# Patient Record
Sex: MALE | NOT HISPANIC OR LATINO | Employment: UNEMPLOYED | URBAN - METROPOLITAN AREA
[De-identification: names, ages, dates, MRNs, and addresses within clinical notes are randomized per-mention and may not be internally consistent; named-entity substitution may affect disease eponyms.]

---

## 2017-04-24 ENCOUNTER — HOSPITAL ENCOUNTER (EMERGENCY)
Facility: CLINIC | Age: 27
Discharge: HOME | End: 2017-04-24
Attending: EMERGENCY MEDICINE | Admitting: EMERGENCY MEDICINE

## 2017-04-24 VITALS
SYSTOLIC BLOOD PRESSURE: 106 MMHG | RESPIRATION RATE: 19 BRPM | DIASTOLIC BLOOD PRESSURE: 69 MMHG | OXYGEN SATURATION: 100 %

## 2017-04-24 DIAGNOSIS — F19.10 SUBSTANCE ABUSE (H): ICD-10-CM

## 2017-04-24 LAB — ALCOHOL BREATH TEST: 0 (ref 0–0.01)

## 2017-04-24 PROCEDURE — 99283 EMERGENCY DEPT VISIT LOW MDM: CPT

## 2017-04-24 PROCEDURE — 82075 ASSAY OF BREATH ETHANOL: CPT

## 2017-04-24 PROCEDURE — 99282 EMERGENCY DEPT VISIT SF MDM: CPT | Mod: Z6 | Performed by: EMERGENCY MEDICINE

## 2017-04-24 NOTE — ED NOTES
Pt came via EMS. Was very upset and belligerent, refusing to answer questions and swearing at staff. Pt has 2 different IDs and 3 different medical cards with 3 different names on them. Pt kept stating that he did not want any medical help.

## 2017-04-24 NOTE — DISCHARGE INSTRUCTIONS
Please make an appointment to follow up with Your Primary Care Provider and [facility] (phone: [phone]) in the next week for recheck.    Return to the ER for problems.

## 2017-04-24 NOTE — ED NOTES
Bed: ED09  Expected date: 4/24/17  Expected time: 1:09 PM  Means of arrival:   Comments:  Male, Syncope, Fabi. [phone]

## 2018-02-23 NOTE — PROGRESS NOTES
SUBJECTIVE:   Charles Chapa is a 27 year old male who presents to clinic today for the following health issues:    Abdominal Pain    Duration: 1 year constantly    Description (location/character/radiation): entire stomach       Associated flank pain: None    Intensity:  moderate    Accompanying signs and symptoms:        Fever/Chills: no        Gas/Bloating: YES       Nausea/vomitting: YES       Diarrhea: no        Dysuria or Hematuria: no     History (previous similar pain/trauma/previous testing): on going health concern for about 1 year or more just didn't have health insurance last year    Patient states he has H Pylori     Precipitating or alleviating factors:       Pain worse with eating/BM/urination: none       Pain relieved by BM: YES    Therapies tried and outcome: None    LMP:  not applicable    Had endoscopy in [place] 2 years ago and was found to have H Pylori and was treated with antibiotic and felt better for about 6-7 months.  Stools been dark and hard. Had blood in stool 8 hrs    Inattention:   Thinks had ADHD because his attention span is low for a tleast 8 years now; when at work his mind is somewhere else.   Has 2 siblings with ADHD.   He is also going to school and finds that his grades are dropping.    Rhinorrhea:   Constant drainage from the nose all year round.    Problem list and histories reviewed & adjusted, as indicated.  Additional history: as documented    There is no problem list on file for this patient.    History reviewed. No pertinent surgical history.    Social History   Substance Use Topics     Smoking status: Current Some Day Smoker     Smokeless tobacco: Never Used     Alcohol use No     History reviewed. No pertinent family history.      Reviewed and updated as needed this visit by clinical staff  ROS:  Constitutional, HEENT, cardiovascular, pulmonary and gu systems are negative, except as otherwise noted.    OBJECTIVE:     /76 (BP Location: Right arm, Patient Position:  "Chair, Cuff Size: Adult Regular)  Pulse 75  Temp 98  F (36.7  C) (Oral)  Resp 12  Ht 6' 1.5\" (1.867 m)  Wt 121 lb 6.4 oz (55.1 kg)  SpO2 100%  BMI 15.8 kg/m2  Body mass index is 15.8 kg/(m^2).  GENERAL: healthy, alert and no distress  NECK: no adenopathy and thyroid normal to palpation  RESP: lungs clear to auscultation - no rales, rhonchi or wheezes  CV: regular rate and rhythm, click or rub, no peripheral edema and peripheral pulses strong  ABDOMEN: soft, nontender, without hepatosplenomegaly or masses and bowel sounds normal  MS: no gross musculoskeletal defects noted, no edema    ASSESSMENT/PLAN:     .(R10.13) Abdominal pain, epigastric  (primary encounter diagnosis)  Comment: Differentials: H Pylori, PUD, GERD, Hepatitis, GB disease, Pancreatitis.   Plan: H Pylori antigen, stool, omeprazole (PRILOSEC)         20 MG CR capsule, Comprehensive metabolic         panel, Lipase, CBC with platelets differential    (K59.00) Constipation, unspecified constipation type  Comment: Non-constipating diet discussed.  Increase fiber and fluid and decrease milk and cheese intake. Miralax as directed to yield clean out and then adjust amount to continue to have two soft stool daily for at least several months before weaning medication.  Call with any questions or concerns.      (R49.451) Inattention  Comment: Discussed evaluation by psychologist for diagnosis  Plan: MENTAL HEALTH REFERRAL  - Adult; Assessments         and Testing; ADHD; [facility]: [facility] [phone]; We will contact you to         schedule the appointment or please call with         any questions    (J31.0) Chronic rhinitis, unspecified type  Comment: Likely allergic  Plan: OTC anti histamines.    Follow up in 1 month or sooner with concerns    David Morris MD  [facility]  "

## 2018-02-26 ENCOUNTER — OFFICE VISIT (OUTPATIENT)
Dept: FAMILY MEDICINE | Facility: CLINIC | Age: 28
End: 2018-02-26
Payer: COMMERCIAL

## 2018-02-26 VITALS
SYSTOLIC BLOOD PRESSURE: 110 MMHG | TEMPERATURE: 98 F | RESPIRATION RATE: 12 BRPM | WEIGHT: 121.4 LBS | OXYGEN SATURATION: 100 % | HEART RATE: 75 BPM | DIASTOLIC BLOOD PRESSURE: 76 MMHG | BODY MASS INDEX: 15.58 KG/M2 | HEIGHT: 74 IN

## 2018-02-26 DIAGNOSIS — R41.840 INATTENTION: ICD-10-CM

## 2018-02-26 DIAGNOSIS — R10.13 ABDOMINAL PAIN, EPIGASTRIC: Primary | ICD-10-CM

## 2018-02-26 DIAGNOSIS — K59.00 CONSTIPATION, UNSPECIFIED CONSTIPATION TYPE: ICD-10-CM

## 2018-02-26 DIAGNOSIS — J31.0 CHRONIC RHINITIS, UNSPECIFIED TYPE: ICD-10-CM

## 2018-02-26 PROCEDURE — 99203 OFFICE O/P NEW LOW 30 MIN: CPT | Performed by: FAMILY MEDICINE

## 2018-02-26 NOTE — PATIENT INSTRUCTIONS
[facility]    If you have any questions regarding to your visit please contact your care team:       Team Purple:   Clinic Hours Telephone Number   Dr. Trudy Dias   7am-7pm  Monday - Thursday   7am-5pm  Fridays  [phone]  (Appointment scheduling available 24/7)    Questions about your Visit?   Team Line:  [phone]   Urgent Care - [place] and [facility] - 11am-9pm Monday-Friday Saturday-Sunday- 9am-5pm   [place] - 5pm-9pm Monday-Friday Saturday-Sunday- 9am-5pm  [phone] - [facility]  [phone] - [place]       What options do I have for visits at the clinic other than the traditional office visit?  To expand how we care for you, many of our providers are utilizing electronic visits (e-visits) and telephone visits, when medically appropriate, for interactions with their patients rather than a visit in the clinic.   We also offer nurse visits for many medical concerns. Just like any other service, we will bill your insurance company for this type of visit based on time spent on the phone with your provider. Not all insurance companies cover these visits. Please check with your medical insurance if this type of visit is covered. You will be responsible for any charges that are not paid by your insurance.      E-visits via Impulcity:  generally incur a $35.00 fee.  Telephone visits:  Time spent on the phone: *charged based on time that is spent on the phone in increments of 10 minutes. Estimated cost:   5-10 mins $30.00   11-20 mins. $59.00   21-30 mins. $85.00     Use Avensohart (secure email communication and access to your chart) to send your primary care provider a message or make an appointment. Ask someone on your Team how to sign up for Impulcity.  For a Price Quote for your services, please call our Consumer Price Line at [phone].  As always, Thank you for trusting us with your health care  needs!    Discharged by Rosalinda BYRD CMA ([facility])

## 2018-02-26 NOTE — MR AVS SNAPSHOT
After Visit Summary   2/26/2018    Charles Chapa    MRN: [id number]           Patient Information     Date Of Birth          1990        Visit Information        Provider Department      2/26/2018 2:20 PM David Morris MD [facility]        Today's Diagnoses     Abdominal pain, epigastric    -  1    Constipation, unspecified constipation type        Inattention        Chronic rhinitis, unspecified type          Care Instructions    [facility]    If you have any questions regarding to your visit please contact your care team:       Team Purple:   Clinic Hours Telephone Number   Dr. Trudy Dias   7am-7pm  Monday - Thursday   7am-5pm  Fridays  [phone]  (Appointment scheduling available 24/7)    Questions about your Visit?   Team Line:  [phone]   [facility] [place] [facility] 11am-9pm Monday-Friday Saturday-Sunday- 9am-5pm   [place] - 5pm-9pm Monday-Friday Saturday-Sunday- 9am-5pm  [phone] - [facility]  [phone] - [place]       What options do I have for visits at the clinic other than the traditional office visit?  To expand how we care for you, many of our providers are utilizing electronic visits (e-visits) and telephone visits, when medically appropriate, for interactions with their patients rather than a visit in the clinic.   We also offer nurse visits for many medical concerns. Just like any other service, we will bill your insurance company for this type of visit based on time spent on the phone with your provider. Not all insurance companies cover these visits. Please check with your medical insurance if this type of visit is covered. You will be responsible for any charges that are not paid by your insurance.      E-visits via Time Bomb Deals:  generally incur a $35.00 fee.  Telephone visits:  Time spent on the phone: *charged based on time that is spent  on the phone in increments of 10 minutes. Estimated cost:   5-10 mins $30.00   11-20 mins. $59.00   21-30 mins. $85.00     Use [email or web address] (secure email communication and access to your chart) to send your primary care provider a message or make an appointment. Ask someone on your Team how to sign up for PHRQLt.  For a Price Quote for your services, please call our [employer] Price Line at [phone].  As always, Thank you for trusting us with your health care needs!    Discharged by Rosalinda BYRD CMA ([facility])            Follow-ups after your visit        Additional Services     MENTAL HEALTH REFERRAL  - Adult; Assessments and Testing; ADHD; [facility]: [facility] [phone]; We will contact you to schedule the appointment or please call with any questions       All scheduling is subject to the client's specific insurance plan & benefits, provider/location availability, and provider clinical specialities.  Please arrive 15 minutes early for your first appointment and bring your completed paperwork.    Please be aware that coverage of these services is subject to the terms and limitations of your health insurance plan.  Call member services at your health plan with any benefit or coverage questions.                            Future tests that were ordered for you today     Open Future Orders        Priority Expected Expires Ordered    H Pylori antigen, stool Routine  3/28/2018 2/26/2018            Who to contact     If you have questions or need follow up information about today's clinic visit or your schedule please contact [facility] directly at [phone].  Normal or non-critical lab and imaging results will be communicated to you by MyChart, letter or phone within 4 business days after the clinic has received the results. If you do not hear from us within 7 days, please contact the clinic through MyChart or phone. If you have a critical or abnormal lab result, we will notify you by phone  "as soon as possible.  Submit refill requests through LensVector or call your pharmacy and they will forward the refill request to us. Please allow 3 business days for your refill to be completed.          Additional Information About Your Visit        LensVector Information     LensVector lets you send messages to your doctor, view your test results, renew your prescriptions, schedule appointments and more. To sign up, go to [email or web address] . Click on \"Log in\" on the left side of the screen, which will take you to the Welcome page. Then click on \"Sign up Now\" on the right side of the page.     You will be asked to enter the access code listed below, as well as some personal information. Please follow the directions to create your username and password.     Your access code is: [email or web address]  Expires: 4/10/2018  7:46 AM     Your access code will  in 90 days. If you need help or a new code, please call your [place] clinic or [phone].        Care EveryWhere ID     This is your Care EveryWhere ID. This could be used by other organizations to access your [place] medical records  [id number]        Your Vitals Were     Pulse Temperature Respirations Height Pulse Oximetry BMI (Body Mass Index)    75 98  F (36.7  C) (Oral) 12 6' 1.5\" (1.867 m) 100% 15.8 kg/m2       Blood Pressure from Last 3 Encounters:   18 110/76   17 106/69    Weight from Last 3 Encounters:   18 121 lb 6.4 oz (55.1 kg)              We Performed the Following     CBC with platelets differential     Comprehensive metabolic panel     Lipase     MENTAL HEALTH REFERRAL  - Adult; Assessments and Testing; ADHD; [facility]: [facility] [phone]; We will contact you to schedule the appointment or please call with any questions          Today's Medication Changes          These changes are accurate as of 18  2:59 PM.  If you have any questions, ask your nurse or doctor.               Start taking these medicines.  "       Dose/Directions    omeprazole 20 MG CR capsule   Commonly known as:  priLOSEC   Used for:  Abdominal pain, epigastric   Started by:  David Morris MD        Dose:  20 mg   Take 1 capsule (20 mg) by mouth daily   Quantity:  15 capsule   Refills:  1            Where to get your medicines      These medications [place] [facility] [place] [address]  [address], [place] [address]     Phone:  [phone]     omeprazole 20 MG CR capsule                Primary Care Provider Office Phone # Fax #    David Morris [phone] [address] [address]        Equal Access to Services     [facility] : Hadii adraino ku hadasho Soomaali, waaxda luqadaha, qaybta kaalmada adeegyada, waxfrederick calhoun . So [facility] [phone].    ATENCIÓN: Si habla español, tiene a munroe disposición servicios gratuitos de asistencia lingüística. [facility] [phone].    We comply with applicable federal civil rights laws and [place] laws. We do not discriminate on the basis of race, color, national origin, age, disability, sex, sexual orientation, or gender identity.            Thank you!     Thank you for choosing [facility]  for your care. Our goal is always to provide you with excellent care. Hearing back from our patients is one way we can continue to improve our services. Please take a few minutes to complete the written survey that you may receive in the mail after your visit with us. Thank you!             Your Updated Medication List - Protect others around you: Learn how to safely use, store and throw away your medicines at www.disposemymeds.org.          This list is accurate as of 2/26/18  2:59 PM.  Always use your most recent med list.                   Brand Name Dispense Instructions for use Diagnosis    omeprazole 20 MG CR capsule    priLOSEC    15 capsule    Take 1 capsule (20 mg)  by mouth daily    Abdominal pain, epigastric

## 2018-02-26 NOTE — NURSING NOTE
"Chief Complaint   Patient presents with     Abdominal Pain     Health Maintenance     TDAP     Referral     Mental Health     Initial /76 (BP Location: Right arm, Patient Position: Chair, Cuff Size: Adult Regular)  Pulse 75  Temp 98  F (36.7  C) (Oral)  Resp 12  Ht 6' 1.5\" (1.867 m)  Wt 121 lb 6.4 oz (55.1 kg)  SpO2 100%  BMI 15.8 kg/m2 Estimated body mass index is 15.8 kg/(m^2) as calculated from the following:    Height as of this encounter: 6' 1.5\" (1.867 m).    Weight as of this encounter: 121 lb 6.4 oz (55.1 kg).  Medication Reconciliation: complete     Alfonzo Hobbs  "

## 2018-03-01 DIAGNOSIS — R10.13 ABDOMINAL PAIN, EPIGASTRIC: ICD-10-CM

## 2018-03-01 PROCEDURE — 87338 HPYLORI STOOL AG IA: CPT | Performed by: FAMILY MEDICINE

## 2018-03-05 ENCOUNTER — TELEPHONE (OUTPATIENT)
Dept: FAMILY MEDICINE | Facility: CLINIC | Age: 28
End: 2018-03-05

## 2018-03-05 DIAGNOSIS — R10.13 ABDOMINAL PAIN, EPIGASTRIC: Primary | ICD-10-CM

## 2018-03-05 LAB
H PYLORI AG STL QL IA: NORMAL
SPECIMEN SOURCE: NORMAL

## 2018-03-05 NOTE — TELEPHONE ENCOUNTER
Called pt and advised that H pylori test came back negative. Wondering about a breath test. He does not believe this result. Would like to know where he should go for the breath test, or would like a call back from provider to discuss.    Rosalinda Heck RN  [facility]

## 2018-03-05 NOTE — TELEPHONE ENCOUNTER
Reason for Call:  Other call back    Detailed comments: Test results. Patient would like a call back.    Phone Number Patient can be reached at: Home number on file [phone] (home)    Best Time: Any    Can we leave a detailed message on this number? YES    Call taken on 3/5/2018 at 12:42 PM by Annabella Covington

## 2018-03-07 ENCOUNTER — TELEPHONE (OUTPATIENT)
Dept: FAMILY MEDICINE | Facility: CLINIC | Age: 28
End: 2018-03-07

## 2018-03-07 NOTE — TELEPHONE ENCOUNTER
Detailed message left on patient's VM with note as written below.  Advised him via VM to schedule a lab appointment at [phone]    Laura Pacheco RN

## 2018-03-07 NOTE — TELEPHONE ENCOUNTER
Reason for Call:  Other call back    Detailed comments: Patient is asking for a referral for an endoscopy/please contact patient with next steps.    Phone Number Patient can be reached at: Home number on file [phone] (home)    Best Time: Anytime    Can we leave a detailed message on this number? YES    Call taken on 3/7/2018 at 1:43 PM by Hailee Flores

## 2018-03-12 DIAGNOSIS — R10.13 ABDOMINAL PAIN, EPIGASTRIC: ICD-10-CM

## 2018-03-12 PROCEDURE — 78267 UREA BREATH TST C-14 ACQUISJ: CPT | Performed by: FAMILY MEDICINE

## 2018-03-12 PROCEDURE — 78268 UREA BREATH TEST C-14 ALYS: CPT | Performed by: FAMILY MEDICINE

## 2018-03-14 ENCOUNTER — TELEPHONE (OUTPATIENT)
Dept: FAMILY MEDICINE | Facility: CLINIC | Age: 28
End: 2018-03-14

## 2018-03-14 NOTE — TELEPHONE ENCOUNTER
Reason for Call:  Other results    Detailed comments: pt had lab work done yesterday, calling for results. Please call pt     Phone Number Patient can be reached at: Home number on file [phone] (home)    Best Time: any    Can we leave a detailed message on this number? YES    Call taken on 3/14/2018 at 12:04 PM by Felipa Hurtado

## 2018-03-14 NOTE — TELEPHONE ENCOUNTER
Spoke with patient and informed the results are still in process. Clinic will call when results come in.     Serena Green RN

## 2018-03-19 LAB — UREA BREATH TEST QL: 38 DPM

## 2018-03-26 NOTE — PROGRESS NOTES
"  SUBJECTIVE:   Charles Chapa is a 27 year old male who presents to clinic today for the following health issues:    Chief Complaint   Patient presents with     Patient Request     requesting test for H pylori               Problem list and histories reviewed & adjusted, as indicated.  Additional history: as documented    There is no problem list on file for this patient.    No past surgical history on file.    Social History   Substance Use Topics     Smoking status: Current Some Day Smoker     Smokeless tobacco: Never Used     Alcohol use No     No family history on file.      Current Outpatient Prescriptions   Medication Sig Dispense Refill     omeprazole (PRILOSEC) 20 MG CR capsule Take 1 capsule (20 mg) by mouth daily (Patient not taking: Reported on 3/27/2018) 15 capsule 1     BP Readings from Last 3 Encounters:   03/27/18 114/74   02/26/18 110/76   04/24/17 106/69    Wt Readings from Last 3 Encounters:   03/27/18 127 lb (57.6 kg)   02/26/18 121 lb 6.4 oz (55.1 kg)                  Labs reviewed in EPIC    Reviewed and updated as needed this visit by clinical staff  Allergies  Meds       Reviewed and updated as needed this visit by Provider         ROS:  This 27 year old male is here today because he is convinced that he has H Pylori again. He was studying medical school in [place] a little over a year ago and had an upper endoscopy where H Pylori was found. Now he has abdomen pains and early satiety. Just can't gain weight because he gets full easily.  He saw Dr. Jackson 1 month ago for his pains and he did both the breath test and the stool test which were negative. He doesn't believe those tests as he was not fasting and his stool sat out for 3 hours before he brought them to the lab. 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 He is overdue for Tdap but refuses to have it because \"he doesn't believe in vaccinations\". He " doesn't trust the government either. He just wants another breath test today. All other review of systems are negative  Personal, family, and social history reviewed with patient and revised.         OBJECTIVE:     /74  Pulse 70  Temp 98.3  F (36.8  C) (Oral)  Resp 16  Wt 127 lb (57.6 kg)  SpO2 99%  BMI 16.53 kg/m2  Body mass index is 16.53 kg/(m^2).  GENERAL: healthy, alert and no distress, but very slim man. Very strong willed and opinionated.   NECK: no adenopathy, no asymmetry, masses, or scars and thyroid normal to palpation  RESP: lungs clear to auscultation - no rales, rhonchi or wheezes  CV: regular rate and rhythm, normal S1 S2, no S3 or S4, or click or rub, no peripheral edema and peripheral pulses strong, but he has a 2/6 systolic ejection murmur heard best at left sternal border   ABDOMEN: soft, no hepatosplenomegaly, no masses and bowel sounds normal, but he is tender in his epigastric area and just above his umbilicus.   MS: no gross musculoskeletal defects noted, no edema    Diagnostic Test Results:  none     ASSESSMENT/PLAN:              1. Stomach pain  As above. His lab tests are not the best way to diagnose H Pylori. Given his other symptoms, he needs a full upper endo   - GASTROENTEROLOGY ADULT REF PROCEDURE ONLY Other; [place] [facility] [phone]    2. Early satiety  As above   - GASTROENTEROLOGY ADULT REF PROCEDURE ONLY Other; [place] [facility] [phone]    3. Heart murmur  As above, he declined EKG    4. Need for prophylactic vaccination with tetanus-diphtheria (TD)  As above, he declined Tdap      Return to clinic if no improvement     BALAJI ZENDEJAS MD  [facility]

## 2018-03-27 ENCOUNTER — OFFICE VISIT (OUTPATIENT)
Dept: FAMILY MEDICINE | Facility: CLINIC | Age: 28
End: 2018-03-27
Payer: COMMERCIAL

## 2018-03-27 VITALS
BODY MASS INDEX: 16.53 KG/M2 | WEIGHT: 127 LBS | HEART RATE: 70 BPM | OXYGEN SATURATION: 99 % | SYSTOLIC BLOOD PRESSURE: 114 MMHG | RESPIRATION RATE: 16 BRPM | TEMPERATURE: 98.3 F | DIASTOLIC BLOOD PRESSURE: 74 MMHG

## 2018-03-27 DIAGNOSIS — R68.81 EARLY SATIETY: ICD-10-CM

## 2018-03-27 DIAGNOSIS — Z23 NEED FOR PROPHYLACTIC VACCINATION WITH TETANUS-DIPHTHERIA (TD): ICD-10-CM

## 2018-03-27 DIAGNOSIS — R01.1 HEART MURMUR: ICD-10-CM

## 2018-03-27 DIAGNOSIS — R10.9 STOMACH PAIN: Primary | ICD-10-CM

## 2018-03-27 PROCEDURE — 99213 OFFICE O/P EST LOW 20 MIN: CPT | Performed by: FAMILY MEDICINE

## 2018-03-27 ASSESSMENT — PAIN SCALES - GENERAL: PAINLEVEL: NO PAIN (0)

## 2018-03-27 NOTE — PATIENT INSTRUCTIONS
[facility]    If you have any questions regarding to your visit please contact your care team:       Team Purple:   Clinic Hours Telephone Number   Dr. Trudy Dias   7am-7pm  Monday - Thursday   7am-5pm  Fridays  [phone]  (Appointment scheduling available 24/7)    Questions about your Visit?   Team Line:  [phone]   Urgent Care - [place] and [facility] - 11am-9pm Monday-Friday Saturday-Sunday- 9am-5pm   [place] - 5pm-9pm Monday-Friday Saturday-Sunday- 9am-5pm  [phone] - [facility]  [phone] - [place]       What options do I have for visits at the clinic other than the traditional office visit?  To expand how we care for you, many of our providers are utilizing electronic visits (e-visits) and telephone visits, when medically appropriate, for interactions with their patients rather than a visit in the clinic.   We also offer nurse visits for many medical concerns. Just like any other service, we will bill your insurance company for this type of visit based on time spent on the phone with your provider. Not all insurance companies cover these visits. Please check with your medical insurance if this type of visit is covered. You will be responsible for any charges that are not paid by your insurance.      E-visits via Tryolabs:  generally incur a $35.00 fee.  Telephone visits:  Time spent on the phone: *charged based on time that is spent on the phone in increments of 10 minutes. Estimated cost:   5-10 mins $30.00   11-20 mins. $59.00   21-30 mins. $85.00     Use [employer] (secure email communication and access to your chart) to send your primary care provider a message or make an appointment. Ask someone on your Team how to sign up for Tryolabs.  For a Price Quote for your services, please call our Consumer Price Line at [phone].  As always, Thank you for trusting us with your health care needs!

## 2018-03-27 NOTE — MR AVS SNAPSHOT
After Visit Summary   3/27/2018    Charles Chapa    MRN: [id number]           Patient Information     Date Of Birth          1990        Visit Information        Provider Department      3/27/2018 2:30 PM Trudy Bonner MD [facility]        Today's Diagnoses     Stomach pain    -  1    Early satiety        Heart murmur        Need for prophylactic vaccination with tetanus-diphtheria (TD)          Care Instructions    [facility]    If you have any questions regarding to your visit please contact your care team:       Team Purple:   Clinic Hours Telephone Number   Dr. Trudy Dias   7am-7pm  Monday - Thursday   7am-5pm  Fridays  [phone]  (Appointment scheduling available 24/7)    Questions about your Visit?   Team Line:  [phone]   Urgent [facility] - [place] [facility] - 11am-9pm Monday-Friday Saturday-Sunday- 9am-5pm   [place] - 5pm-9pm Monday-Friday Saturday-Sunday- 9am-5pm  [phone] - [facility]  [phone] - [place]       What options do I have for visits at the clinic other than the traditional office visit?  To expand how we care for you, many of our providers are utilizing electronic visits (e-visits) and telephone visits, when medically appropriate, for interactions with their patients rather than a visit in the clinic.   We also offer nurse visits for many medical concerns. Just like any other service, we will bill your insurance company for this type of visit based on time spent on the phone with your provider. Not all insurance companies cover these visits. Please check with your medical insurance if this type of visit is covered. You will be responsible for any charges that are not paid by your insurance.      E-visits via [employer]:  generally incur a $35.00 fee.  Telephone visits:  Time spent on the phone: *charged based on time that is spent on the phone in  increments of 10 minutes. Estimated cost:   5-10 mins $30.00   11-20 mins. $59.00   21-30 mins. $85.00     Use [employer] (secure email communication and access to your chart) to send your primary care provider a message or make an appointment. Ask someone on your Team how to sign up for [employer].  For a Price Quote for your services, please call our [employer] Line at [phone].  As always, Thank you for trusting us with your health care needs!              Follow-ups after your visit        Additional Services     GASTROENTEROLOGY ADULT REF PROCEDURE ONLY Other; [facility] [phone]       Last Lab Result: No results found for: CR  Body mass index is 16.53 kg/(m^2).      Patient will be contacted to schedule procedure.     Please be aware that coverage of these services is subject to the terms and limitations of your health insurance plan.  Call member services at your health plan with any benefit or coverage questions.  Any procedures must be performed at a [place] facility OR coordinated by your clinic's referral office.    Please bring the following with you to your appointment:    (1) Any X-Rays, CTs or MRIs which have been performed.  Contact the facility where they were done to arrange for  prior to your scheduled appointment.    (2) List of current medications   (3) This referral request   (4) Any documents/labs given to you for this referral                  Who to contact     If you have questions or need follow up information about today's clinic visit or your schedule please contact [facility] [place] directly at [phone].  Normal or non-critical lab and imaging results will be communicated to you by MyChart, letter or phone within 4 business days after the clinic has received the results. If you do not hear from us within 7 days, please contact the clinic through MyChart or phone. If you have a critical or abnormal lab result, we will notify you by phone as soon as possible.  Submit  "refill requests through [employer] or call your pharmacy and they will forward the refill request to us. Please allow 3 business days for your refill to be completed.          Additional Information About Your Visit        [facility] Information     [employer] lets you send messages to your doctor, view your test results, renew your prescriptions, schedule appointments and more. To sign up, go to [email or web address] . Click on \"Log in\" on the left side of the screen, which will take you to the Welcome page. Then click on \"Sign up Now\" on the right side of the page.     You will be asked to enter the access code listed below, as well as some personal information. Please follow the directions to create your username and password.     Your access code is: [email or web address]  Expires: 4/10/2018  8:46 AM     Your access code will  in 90 days. If you need help or a new code, please call your [place] clinic or [phone].        Care EveryWhere ID     This is your Care EveryWhere ID. This could be used by other organizations to access your [place] medical records  [id number]        Your Vitals Were     Pulse Temperature Respirations Pulse Oximetry BMI (Body Mass Index)       70 98.3  F (36.8  C) (Oral) 16 99% 16.53 kg/m2        Blood Pressure from Last 3 Encounters:   18 114/74   18 110/76   17 106/69    Weight from Last 3 Encounters:   18 127 lb (57.6 kg)   18 121 lb 6.4 oz (55.1 kg)              We Performed the Following     GASTROENTEROLOGY ADULT REF PROCEDURE ONLY Other; [facility] [phone]        Primary Care Provider Office Phone # Fax #    Trudy Bonner [phone]       [address] [address]        Equal Access to Services     VLADIMIR WAYNE : Alfredo Ambriz, waalanna mitchellqyassine, flory liriano, brandon [place]. So [facility] [phone].    ATENCIÓN: Si esme montgomery, " tiene a munroe disposición servicios gratuitos de asistencia lingüística. Fernando de souza [phone].    We comply with applicable federal civil rights laws and [place] laws. We do not discriminate on the basis of race, color, national origin, age, disability, sex, sexual orientation, or gender identity.            Thank you!     Thank you for choosing [facility]  for your care. Our goal is always to provide you with excellent care. Hearing back from our patients is one way we can continue to improve our services. Please take a few minutes to complete the written survey that you may receive in the mail after your visit with us. Thank you!             Your Updated Medication List - Protect others around you: Learn how to safely use, store and throw away your medicines at www.disposemymeds.org.          This list is accurate as of 3/27/18  3:12 PM.  Always use your most recent med list.                   Brand Name Dispense Instructions for use Diagnosis    omeprazole 20 MG CR capsule    priLOSEC    15 capsule    Take 1 capsule (20 mg) by mouth daily    Abdominal pain, epigastric

## 2018-06-28 ENCOUNTER — OFFICE VISIT (OUTPATIENT)
Dept: FAMILY MEDICINE | Facility: CLINIC | Age: 28
End: 2018-06-28
Payer: COMMERCIAL

## 2018-06-28 VITALS
HEART RATE: 76 BPM | DIASTOLIC BLOOD PRESSURE: 57 MMHG | HEIGHT: 74 IN | WEIGHT: 124 LBS | TEMPERATURE: 97.9 F | BODY MASS INDEX: 15.92 KG/M2 | SYSTOLIC BLOOD PRESSURE: 89 MMHG

## 2018-06-28 DIAGNOSIS — Z91.199 NO-SHOW FOR APPOINTMENT: Primary | ICD-10-CM

## 2018-06-28 NOTE — PROGRESS NOTES
SUBJECTIVE:   Charles Chapa is a 27 year old male who presents to clinic today for the following health issues:      ABDOMINAL PAIN     Onset: few months     Description:   Character: Sharp  Location: epigastric region  Radiation: None    Intensity: severe    Progression of Symptoms:  worsening and constant    Accompanying Signs & Symptoms:  Fever/Chills?: YES  Gas/Bloating: YES  Nausea: YES  Vomitting: YES  Diarrhea?: YES  Constipation:YES  Dysuria or Hematuria: no    History:   Trauma: no   Previous similar pain: YES   Previous tests done: none    Precipitating factors:   Does the pain change with:     Food: no      BM: YES- gettng better    Urination: no     Alleviating factors:  eating    Therapies Tried and outcome: Omeprazole did not help with symptoms     LMP:  not applicable       This provider went into patient room at 2:58 pm (for the scheduled 3 pm appointment) and patient was not in the room.  Medical assistant staff searched the clinic and patient not found.  Provider waited until 3:35 pm and patient still did not come back to the room.    Non-billable visit.    Leann Stewart, DNP, APRN, CNP

## 2018-06-28 NOTE — MR AVS SNAPSHOT
"              After Visit Summary   6/28/2018    Charles Chapa    MRN: [id number]           Patient Information     Date Of Birth          1990        Visit Information        Provider Department      6/28/2018 3:00 PM Leann Stewart NP [facility]        Today's Diagnoses     No-show for appointment    -  1       Follow-ups after your visit        Who to contact     If you have questions or need follow up information about today's clinic visit or your schedule please contact [facility] [place] [facility] directly at [phone].  Normal or non-critical lab and imaging results will be communicated to you by MyChart, letter or phone within 4 business days after the clinic has received the results. If you do not hear from us within 7 days, please contact the clinic through MyChart or phone. If you have a critical or abnormal lab result, we will notify you by phone as soon as possible.  Submit refill requests through [employer] or call your pharmacy and they will forward the refill request to us. Please allow 3 business days for your refill to be completed.          Additional Information About Your Visit        Care EveryWhere ID     This is your Care EveryWhere ID. This could be used by other organizations to access your [place] medical records  [id number]        Your Vitals Were     Pulse Temperature Height BMI (Body Mass Index)          76 97.9  F (36.6  C) (Oral) 6' 1.5\" (1.867 m) 16.14 kg/m2         Blood Pressure from Last 3 Encounters:   06/28/18 (!) 89/57   03/27/18 114/74   02/26/18 110/76    Weight from Last 3 Encounters:   06/28/18 124 lb (56.2 kg)   03/27/18 127 lb (57.6 kg)   02/26/18 121 lb 6.4 oz (55.1 kg)              Today, you had the following     No orders found for display       Primary Care Provider Office Phone # Fax #    Trudy Bonner [phone] [address] [facility] [address]        Equal Access to Services     [facility] " GAAR : Hadii adriano sibley beryl Ambriz, waaxda luqadaha, qaybta kabertda jejuan mmaddie, waxfrederick talib hayjose tomasjaimeaurelio [place]. So [facility] [phone].    ATENCIÓN: Si habla español, tiene a munroe disposición servicios gratuitos de asistencia lingüística. Llame al [phone].    We comply with applicable federal civil rights laws and [place] laws. We do not discriminate on the basis of race, color, national origin, age, disability, sex, sexual orientation, or gender identity.            Thank you!     Thank you for choosing [facility]  for your care. Our goal is always to provide you with excellent care. Hearing back from our patients is one way we can continue to improve our services. Please take a few minutes to complete the written survey that you may receive in the mail after your visit with us. Thank you!             Your Updated Medication List - Protect others around you: Learn how to safely use, store and throw away your medicines at www.disposemymeds.org.          This list is accurate as of 6/28/18  3:38 PM.  Always use your most recent med list.                   Brand Name Dispense Instructions for use Diagnosis    omeprazole 20 MG CR capsule    priLOSEC    15 capsule    Take 1 capsule (20 mg) by mouth daily    Abdominal pain, epigastric

## 2018-09-07 ENCOUNTER — OFFICE VISIT (OUTPATIENT)
Dept: FAMILY MEDICINE | Facility: CLINIC | Age: 28
End: 2018-09-07
Payer: COMMERCIAL

## 2018-09-07 VITALS
OXYGEN SATURATION: 98 % | TEMPERATURE: 98.3 F | SYSTOLIC BLOOD PRESSURE: 103 MMHG | HEART RATE: 91 BPM | DIASTOLIC BLOOD PRESSURE: 65 MMHG | BODY MASS INDEX: 16.4 KG/M2 | WEIGHT: 126 LBS

## 2018-09-07 DIAGNOSIS — R10.13 EPIGASTRIC PAIN: Primary | ICD-10-CM

## 2018-09-07 DIAGNOSIS — Z86.19 HISTORY OF HELICOBACTER PYLORI INFECTION: ICD-10-CM

## 2018-09-07 LAB
ALBUMIN SERPL-MCNC: 3.4 G/DL (ref 3.4–5)
ALP SERPL-CCNC: 97 U/L (ref 40–150)
ALT SERPL W P-5'-P-CCNC: 20 U/L (ref 0–70)
ANION GAP SERPL CALCULATED.3IONS-SCNC: 7 MMOL/L (ref 3–14)
AST SERPL W P-5'-P-CCNC: 19 U/L (ref 0–45)
BILIRUB SERPL-MCNC: 0.2 MG/DL (ref 0.2–1.3)
BUN SERPL-MCNC: 11 MG/DL (ref 7–30)
CALCIUM SERPL-MCNC: 8.2 MG/DL (ref 8.5–10.1)
CHLORIDE SERPL-SCNC: 105 MMOL/L (ref 94–109)
CO2 SERPL-SCNC: 29 MMOL/L (ref 20–32)
CREAT SERPL-MCNC: 0.98 MG/DL (ref 0.66–1.25)
ERYTHROCYTE [DISTWIDTH] IN BLOOD BY AUTOMATED COUNT: 12.8 % (ref 10–15)
GFR SERPL CREATININE-BSD FRML MDRD: >90 ML/MIN/1.7M2
GLUCOSE SERPL-MCNC: 81 MG/DL (ref 70–99)
HCT VFR BLD AUTO: 41.2 % (ref 40–53)
HGB BLD-MCNC: 14.3 G/DL (ref 13.3–17.7)
LIPASE SERPL-CCNC: 133 U/L (ref 73–393)
MCH RBC QN AUTO: 32.8 PG (ref 26.5–33)
MCHC RBC AUTO-ENTMCNC: 34.7 G/DL (ref 31.5–36.5)
MCV RBC AUTO: 95 FL (ref 78–100)
PLATELET # BLD AUTO: 308 10E9/L (ref 150–450)
POTASSIUM SERPL-SCNC: 3.7 MMOL/L (ref 3.4–5.3)
PROT SERPL-MCNC: 6.5 G/DL (ref 6.8–8.8)
RBC # BLD AUTO: 4.36 10E12/L (ref 4.4–5.9)
SODIUM SERPL-SCNC: 141 MMOL/L (ref 133–144)
WBC # BLD AUTO: 7 10E9/L (ref 4–11)

## 2018-09-07 PROCEDURE — 80053 COMPREHEN METABOLIC PANEL: CPT | Performed by: NURSE PRACTITIONER

## 2018-09-07 PROCEDURE — 83690 ASSAY OF LIPASE: CPT | Performed by: NURSE PRACTITIONER

## 2018-09-07 PROCEDURE — 99213 OFFICE O/P EST LOW 20 MIN: CPT | Performed by: NURSE PRACTITIONER

## 2018-09-07 PROCEDURE — 36415 COLL VENOUS BLD VENIPUNCTURE: CPT | Performed by: NURSE PRACTITIONER

## 2018-09-07 PROCEDURE — 85027 COMPLETE CBC AUTOMATED: CPT | Performed by: NURSE PRACTITIONER

## 2018-09-07 ASSESSMENT — PAIN SCALES - GENERAL: PAINLEVEL: EXTREME PAIN (8)

## 2018-09-07 NOTE — PATIENT INSTRUCTIONS
We will fax the order for endoscopy to [facility]  Allow until Wednesday for them to contact you  If you do not hear from them by Wednesday, you can call them at [phone] to schedule    In the meantime, start taking 1 capsule omeprazole every morning 30 minutes before breakfast

## 2018-09-07 NOTE — PROGRESS NOTES
SUBJECTIVE:   Charles Chapa is a 28 year old male who presents to clinic today for the following health issues:      Patient is here today to follow up on his stomach pain, nothing has changed he needs a referral to see [facility] for a Endoscopy.    He last saw Dr. Bonner 3/27/18 for abdominal pain and early satiety  He was subsequently dismissed from that clinic d/t 8 no shows  When last seen he was requesting H pylori testing despite previously having negative stool and breath test  He was therefore referred to [facility] for endoscopy   Weight is stable    He is continuing to have epigastric pain  Early satiety, acid reflux  He took omeprazole for 1 week without improvement  He has never taken for longer    Hx H pylori in [place] in 2016  Treated with antibiotics for 2 weeks  And symptoms resolved        Problem list and histories reviewed & adjusted, as indicated.  Additional history: none    Patient Active Problem List   Diagnosis     Heart murmur     History reviewed. No pertinent surgical history.    Social History   Substance Use Topics     Smoking status: Former Smoker     Quit date: 5/1/2018     Smokeless tobacco: Never Used     Alcohol use No     Family History   Problem Relation Age of Onset     Diabetes No family hx of      Hypertension No family hx of            Reviewed and updated as needed this visit by clinical staff  Tobacco  Allergies  Meds  Med Hx  Surg Hx  Fam Hx  Soc Hx      Reviewed and updated as needed this visit by Provider         ROS:  Constitutional, HEENT, cardiovascular, pulmonary, gi and gu systems are negative, except as otherwise noted.    OBJECTIVE:     /65 (BP Location: Right arm, Patient Position: Chair, Cuff Size: Adult Regular)  Pulse 91  Temp 98.3  F (36.8  C) (Oral)  Wt 126 lb (57.2 kg)  SpO2 98%  BMI 16.4 kg/m2  Body mass index is 16.4 kg/(m^2).  GENERAL: healthy, alert and no distress  RESP: lungs clear to auscultation - no rales, rhonchi or wheezes  CV:  regular rate and rhythm, normal S1 S2, no S3 or S4, no murmur, click or rub, no peripheral edema and peripheral pulses strong  ABDOMEN: soft, tenderness in epigastrium and RUQ, no hepatosplenomegaly, no masses and bowel sounds normal    Diagnostic Test Results:  Results for orders placed or performed in visit on 03/12/18   H pylori breath test   Result Value Ref Range    H Pylori Breath Test 38 DPM       ASSESSMENT/PLAN:       ICD-10-CM    1. Epigastric pain R10.13 GASTROENTEROLOGY ADULT REF PROCEDURE ONLY Other; [facility] [phone]     omeprazole (PRILOSEC) 20 MG CR capsule     Comprehensive metabolic panel (BMP + Alb, Alk Phos, ALT, AST, Total. Bili, TP)     CBC with platelets     Lipase   2. History of Helicobacter pylori infection Z86.19 GASTROENTEROLOGY ADULT REF PROCEDURE ONLY Other; [facility] [phone]       Patient Instructions   We will fax the order for endoscopy to [facility]  Allow until Wednesday for them to contact you  If you do not hear from them by Wednesday, you can call them at [phone] to schedule    In the meantime, start taking 1 capsule omeprazole every morning 30 minutes before breakfast        [facility]

## 2018-09-07 NOTE — MR AVS SNAPSHOT
After Visit Summary   9/7/2018    Charles Chapa    MRN: [id number]           Patient Information     Date Of Birth          1990        Visit Information        Provider Department      9/7/2018 2:40 PM Elizabeth Hawkins APRN CNP [facility]        Today's Diagnoses     Epigastric pain    -  1    History of Helicobacter pylori infection          Care Instructions    We will fax the order for endoscopy to [facility]  Allow until Wednesday for them to contact you  If you do not hear from them by Wednesday, you can call them at [phone] to schedule    In the meantime, start taking 1 capsule omeprazole every morning 30 minutes before breakfast            Follow-ups after your visit        Additional Services     GASTROENTEROLOGY ADULT REF PROCEDURE ONLY Other; [facility] [phone]       Last Lab Result: No results found for: CR  Body mass index is 16.4 kg/(m^2).     Needed:  No  Language:  English    Patient will be contacted to schedule procedure.     Please be aware that coverage of these services is subject to the terms and limitations of your health insurance plan.  Call member services at your health plan with any benefit or coverage questions.  Any procedures must be performed at a [facility] facility OR coordinated by your clinic's referral office.    Please bring the following with you to your appointment:    (1) Any X-Rays, CTs or MRIs which have been performed.  Contact the facility where they were done to arrange for  prior to your scheduled appointment.    (2) List of current medications   (3) This referral request   (4) Any documents/labs given to you for this referral                  Who to contact     If you have questions or need follow up information about today's clinic visit or your schedule please contact [facility] directly at [phone].  Normal or non-critical lab and imaging results will be  communicated to you by MyChart, letter or phone within 4 business days after the clinic has received the results. If you do not hear from us within 7 days, please contact the clinic through MyChart or phone. If you have a critical or abnormal lab result, we will notify you by phone as soon as possible.  Submit refill requests through [employer] or call your pharmacy and they will forward the refill request to us. Please allow 3 business days for your refill to be completed.          Additional Information About Your Visit        Care EveryWhere ID     This is your Care EveryWhere ID. This could be used by other organizations to access your [place] medical records  [id number]        Your Vitals Were     Pulse Temperature Pulse Oximetry BMI (Body Mass Index)          91 98.3  F (36.8  C) (Oral) 98% 16.4 kg/m2         Blood Pressure from Last 3 Encounters:   09/07/18 103/65   06/28/18 (!) 89/57   03/27/18 114/74    Weight from Last 3 Encounters:   09/07/18 126 lb (57.2 kg)   06/28/18 124 lb (56.2 kg)   03/27/18 127 lb (57.6 kg)              We Performed the Following     CBC with platelets     Comprehensive metabolic panel (BMP + Alb, Alk Phos, ALT, AST, Total. Bili, TP)     GASTROENTEROLOGY ADULT REF PROCEDURE ONLY Other; [facility] [phone]     Lipase          Today's Medication Changes          These changes are accurate as of 9/7/18  3:14 PM.  If you have any questions, ask your nurse or doctor.               Start taking these medicines.        Dose/Directions    omeprazole 20 MG CR capsule   Commonly known as:  priLOSEC   Used for:  Epigastric pain   Started by:  Elizabeth Hawkins APRN CNP        Dose:  20 mg   Take 1 capsule (20 mg) by mouth daily   Quantity:  30 capsule   Refills:  1            Where to get your medicines      These medications were sent to [facility] [place] - [place], [place] - [address]  [address], [facility] [address]     Phone:   [phone]     omeprazole 20 MG CR capsule                Primary Care Provider Office Phone # Fax #    Trudy Bonner [phone] [address] [facility] [address]        Equal Access to Services     VLADIMIR WAYNE : Hadii aad ku hadasho Soomaali, waaxda luqadaha, qaybta kaalmada adeegyada, waxfrederick mayers nakian angella larryaurelio [place]. So [facility] [phone].    ATENCIÓN: Si habla español, tiene a munroe disposición servicios gratuitos de asistencia lingüística. Llame al [phone].    We comply with applicable federal civil rights laws and [place] laws. We do not discriminate on the basis of race, color, national origin, age, disability, sex, sexual orientation, or gender identity.            Thank you!     Thank you for choosing [facility]  for your care. Our goal is always to provide you with excellent care. Hearing back from our patients is one way we can continue to improve our services. Please take a few minutes to complete the written survey that you may receive in the mail after your visit with us. Thank you!             Your Updated Medication List - Protect others around you: Learn how to safely use, store and throw away your medicines at www.disposemymeds.org.          This list is accurate as of 9/7/18  3:14 PM.  Always use your most recent med list.                   Brand Name Dispense Instructions for use Diagnosis    omeprazole 20 MG CR capsule    priLOSEC    30 capsule    Take 1 capsule (20 mg) by mouth daily    Epigastric pain

## 2018-09-07 NOTE — LETTER
[facility] [place] [facility]  [address] [place]  [address]  [phone]        September 13, 2018    Charles Chapa  [address]  [place] [address]        Dear Charles,    The results of your recent labs are enclosed.  Your calcium and protein levels are slightly below normal. Work on increasing your calcium consumption and eating more protein.   I think this is unrelated to your current symptoms. The remainder of your labs were within normal ranges.   Hopefully you have been able to scheduled your endoscopy.   Please call the clinic if you have any concerns.    Results for orders placed or performed in visit on 09/07/18   Comprehensive metabolic panel (BMP + Alb, Alk Phos, ALT, AST, Total. Bili, TP)   Result Value Ref Range    Sodium 141 133 - 144 mmol/L    Potassium 3.7 3.4 - 5.3 mmol/L    Chloride 105 94 - 109 mmol/L    Carbon Dioxide 29 20 - 32 mmol/L    Anion Gap 7 3 - 14 mmol/L    Glucose 81 70 - 99 mg/dL    Urea Nitrogen 11 7 - 30 mg/dL    Creatinine 0.98 0.66 - 1.25 mg/dL    GFR Estimate >90 >60 mL/min/1.7m2    GFR Estimate If Black >90 >60 mL/min/1.7m2    Calcium 8.2 (L) 8.5 - 10.1 mg/dL    Bilirubin Total 0.2 0.2 - 1.3 mg/dL    Albumin 3.4 3.4 - 5.0 g/dL    Protein Total 6.5 (L) 6.8 - 8.8 g/dL    Alkaline Phosphatase 97 40 - 150 U/L    ALT 20 0 - 70 U/L    AST 19 0 - 45 U/L   CBC with platelets   Result Value Ref Range    WBC 7.0 4.0 - 11.0 10e9/L    RBC Count 4.36 (L) 4.4 - 5.9 10e12/L    Hemoglobin 14.3 13.3 - 17.7 g/dL    Hematocrit 41.2 40.0 - 53.0 %    MCV 95 78 - 100 fl    MCH 32.8 26.5 - 33.0 pg    MCHC 34.7 31.5 - 36.5 g/dL    RDW 12.8 10.0 - 15.0 %    Platelet Count 308 150 - 450 10e9/L   Lipase   Result Value Ref Range    Lipase 133 73 - 393 U/L       If you have any questions please call the clinic at [phone].    Sincerely,    Elizabeth HAWKINS CNP, LMD

## 2018-09-13 NOTE — PROGRESS NOTES
[address] [address]  Phone: [phone]  Fax: [phone]      09/13/18    Charles Chapa  [address] [place] [address]      Dear Charles,    The results of your recent labs are enclosed.  Your calcium and protein levels are slightly below normal. Work on increasing your calcium consumption and eating more protein.   I think this is unrelated to your current symptoms. The remainder of your labs were within normal ranges.   Hopefully you have been able to scheduled your endoscopy.   Please call the clinic if you have any concerns.    Sincerely,    ROSS Moreira CNP    Your [facility] Care Team

## 2018-09-14 ENCOUNTER — TRANSFERRED RECORDS (OUTPATIENT)
Dept: HEALTH INFORMATION MANAGEMENT | Facility: CLINIC | Age: 28
End: 2018-09-14
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
## 2021-02-09 ENCOUNTER — VIRTUAL VISIT (OUTPATIENT)
Dept: FAMILY MEDICINE | Facility: CLINIC | Age: 31
End: 2021-02-09
Payer: COMMERCIAL

## 2021-02-09 DIAGNOSIS — K21.9 GASTROESOPHAGEAL REFLUX DISEASE, UNSPECIFIED WHETHER ESOPHAGITIS PRESENT: Primary | ICD-10-CM

## 2021-02-09 PROCEDURE — 99213 OFFICE O/P EST LOW 20 MIN: CPT | Mod: 95 | Performed by: PHYSICIAN ASSISTANT

## 2021-02-09 RX ORDER — ALPRAZOLAM 2 MG
1 TABLET ORAL 3 TIMES DAILY
COMMUNITY
Start: 2021-01-15 | End: 2023-09-05

## 2021-02-09 RX ORDER — BUSPIRONE HYDROCHLORIDE 15 MG/1
15 TABLET ORAL 3 TIMES DAILY
Status: ON HOLD | COMMUNITY
Start: 2021-01-15 | End: 2021-07-12

## 2021-02-09 NOTE — PATIENT INSTRUCTIONS
Patient Education     Tips to Control Acid Reflux    To control acid reflux, you ll need to make some basic diet and lifestyle changes. The simple steps outlined below may be all you ll need to ease discomfort.   Watch what you eat    Don't have fatty foods or spicy foods.    Eat fewer acidic foods, such as citrus and tomato-based foods. These can increase symptoms.    Limit drinking alcohol, caffeine, and fizzy beverages. All increase acid reflux.    Try limiting chocolate, peppermint, and spearmint. These can make acid reflux worse in some people.    Watch when you eat    Don't lie down for 3 hours after eating.    Don't snack before going to bed.    Raise your head  Raising your head and upper body by 4 to 6 inches helps limit reflux when you re lying down. Put blocks under the head of your bed frame or a wedge under your mattress to raise it.   Other changes    Lose weight, if you need to    Don t exercise near bedtime    Don't wear tight-fitting clothes    Limit aspirin and ibuprofen    Stop smoking    [employer] last reviewed this educational content on 6/1/2019 2000-2020 [employer], [email or web address]. [address] [place] [address], [place] [address]. All rights reserved. This information is not intended as a substitute for professional medical care. Always follow your healthcare professional's instructions.

## 2021-02-09 NOTE — PROGRESS NOTES
Charles is a 30 year old who is being evaluated via a billable telephone visit.      How would you like to obtain your AVS? Mail a copy  If the video visit is dropped, the invitation should be resent by: Text to cell phone: [phone]  Will anyone else be joining your video visit? No      Assessment & Plan     Gastroesophageal reflux disease, unspecified whether esophagitis present  - Helicobacter pylori Antigen Stool; Future  - omeprazole (PRILOSEC) 20 MG DR capsule; Take 1 capsule (20 mg) by mouth daily        Return if symptoms worsen or fail to improve.    DEEPA Jhaveri [facility]    Subjective     Charles is a 30 year old who presents to clinic today for the following health issues  accompanied by his self:    HPI       GERD/Heartburn  Onset/Duration: ongoing 6 months   Description: heartburn, unable to keep food down. Bad reflux  Intensity: 8/10  Progression of Symptoms: worsening  Accompanying Signs & Symptoms:  Does it feel like food gets stuck or trouble swallowing: YES-food is gets stuck  Nausea: YES  Vomiting (bloody?): YES-  Vomited bloody last week  Abdominal Pain: YES-upper left  Black-Tarry stools: YES  Bloody stools: YES  History:  Previous similar episodes: YES  Previous ulcers: no  Precipitating factors:   Caffeine use: YES  Alcohol use: no  NSAID/Aspirin use: no  Tobacco use: YES  Worse with in the morning.  Alleviating factors: None  Therapies tried and outcome:             Lifestyle changes: yes but not helping             Medications: none    PMHx for Hpylori infection and sx seem similar.    Review of Systems   Constitutional, HEENT, cardiovascular, pulmonary, gi and gu systems are negative, except as otherwise noted.      Objective           Vitals:  No vitals were obtained today due to virtual visit.                    Video-Visit Details    Type of service:  Telephone    Video End Time:12 Minutes    Originating Location (pt. Location): Home    Distant  Location (provider location):  [facility]     Platform used for Video Visit: Rafia

## 2021-02-15 PROCEDURE — 87338 HPYLORI STOOL AG IA: CPT | Performed by: PHYSICIAN ASSISTANT

## 2021-02-16 DIAGNOSIS — K21.9 GASTROESOPHAGEAL REFLUX DISEASE, UNSPECIFIED WHETHER ESOPHAGITIS PRESENT: ICD-10-CM

## 2021-02-17 ENCOUNTER — TELEPHONE (OUTPATIENT)
Dept: FAMILY MEDICINE | Facility: CLINIC | Age: 31
End: 2021-02-17

## 2021-02-17 LAB — H PYLORI AG STL QL IA: NEGATIVE

## 2021-02-17 NOTE — TELEPHONE ENCOUNTER
Reason for Call:  Request for results:    Name of test or procedure: lab work    Date of test of procedure: 2-16-21    Location of the test or procedure: [facility]    OK to leave the result message on voice mail or with a family member? YES    Phone number Patient can be reached at:  Home number on file [phone] (home)    Additional comments: Please call with results, thank you.    Call taken on 2/17/2021 at 2:19 PM by Binta Hicks

## 2021-02-18 NOTE — TELEPHONE ENCOUNTER
Called and left detailed message stating results were negative. Call [phone] if any further questions or concerns.    Rosalinda Heck RN  [facility]

## 2021-02-19 NOTE — TELEPHONE ENCOUNTER
Patient asking if he can just get a referral to have an upper endoscopy completed  Is taking Omeprazole daily but is not relieving acid reflux and nausea    Laura Hawk RN  [facility]

## 2021-02-19 NOTE — TELEPHONE ENCOUNTER
Have Patient schedule telephone visit 2 weeks after starting medication for follow up.  Will review labs then.  Cesario ARENAS

## 2021-02-19 NOTE — TELEPHONE ENCOUNTER
Patient called back and would like the provider to call him back to discuss.     Please call him at [phone]    Ok to leave message if needed.     Jillian Hawkins,

## 2021-02-23 NOTE — TELEPHONE ENCOUNTER
Pt was given provider's message as written. Agrees with plan.    Rosalinda Heck RN  [facility]

## 2021-04-07 DIAGNOSIS — K21.9 GASTROESOPHAGEAL REFLUX DISEASE, UNSPECIFIED WHETHER ESOPHAGITIS PRESENT: ICD-10-CM

## 2021-07-08 ENCOUNTER — TELEPHONE (OUTPATIENT)
Dept: BEHAVIORAL HEALTH | Facility: CLINIC | Age: 31
End: 2021-07-08

## 2021-07-08 ENCOUNTER — HOSPITAL ENCOUNTER (INPATIENT)
Facility: CLINIC | Age: 31
LOS: 1 days | Discharge: OTHER INSTITUTION | End: 2021-07-09
Attending: EMERGENCY MEDICINE | Admitting: PSYCHIATRY & NEUROLOGY
Payer: COMMERCIAL

## 2021-07-08 VITALS
RESPIRATION RATE: 16 BRPM | BODY MASS INDEX: 14.42 KG/M2 | HEART RATE: 98 BPM | TEMPERATURE: 98.3 F | DIASTOLIC BLOOD PRESSURE: 80 MMHG | WEIGHT: 112.4 LBS | OXYGEN SATURATION: 100 % | HEIGHT: 74 IN | SYSTOLIC BLOOD PRESSURE: 110 MMHG

## 2021-07-08 DIAGNOSIS — Z20.822 CONTACT WITH AND (SUSPECTED) EXPOSURE TO COVID-19: ICD-10-CM

## 2021-07-08 DIAGNOSIS — R41.82 ALTERED MENTAL STATUS, UNSPECIFIED ALTERED MENTAL STATUS TYPE: ICD-10-CM

## 2021-07-08 DIAGNOSIS — F19.959 DRUG-INDUCED PSYCHOTIC DISORDER WITH COMPLICATION (H): ICD-10-CM

## 2021-07-08 LAB
ALBUMIN SERPL-MCNC: 3.7 G/DL (ref 3.4–5)
ALP SERPL-CCNC: 100 U/L (ref 40–150)
ALT SERPL W P-5'-P-CCNC: 27 U/L (ref 0–70)
ANION GAP SERPL CALCULATED.3IONS-SCNC: 1 MMOL/L (ref 3–14)
APAP SERPL-MCNC: <2 MG/L (ref 10–20)
AST SERPL W P-5'-P-CCNC: 25 U/L (ref 0–45)
BASOPHILS # BLD AUTO: 0 10E9/L (ref 0–0.2)
BASOPHILS NFR BLD AUTO: 0.6 %
BILIRUB SERPL-MCNC: 0.6 MG/DL (ref 0.2–1.3)
BUN SERPL-MCNC: 17 MG/DL (ref 7–30)
CALCIUM SERPL-MCNC: 8.9 MG/DL (ref 8.5–10.1)
CHLORIDE SERPL-SCNC: 108 MMOL/L (ref 94–109)
CO2 SERPL-SCNC: 29 MMOL/L (ref 20–32)
CREAT SERPL-MCNC: 0.87 MG/DL (ref 0.66–1.25)
DIFFERENTIAL METHOD BLD: ABNORMAL
EOSINOPHIL # BLD AUTO: 0.3 10E9/L (ref 0–0.7)
EOSINOPHIL NFR BLD AUTO: 4.8 %
ERYTHROCYTE [DISTWIDTH] IN BLOOD BY AUTOMATED COUNT: 12.2 % (ref 10–15)
GFR SERPL CREATININE-BSD FRML MDRD: >90 ML/MIN/{1.73_M2}
GLUCOSE SERPL-MCNC: 102 MG/DL (ref 70–99)
HCT VFR BLD AUTO: 41.3 % (ref 40–53)
HGB BLD-MCNC: 13.7 G/DL (ref 13.3–17.7)
IMM GRANULOCYTES # BLD: 0 10E9/L (ref 0–0.4)
IMM GRANULOCYTES NFR BLD: 0.3 %
LABORATORY COMMENT REPORT: NORMAL
LYMPHOCYTES # BLD AUTO: 1.4 10E9/L (ref 0.8–5.3)
LYMPHOCYTES NFR BLD AUTO: 22.2 %
MCH RBC QN AUTO: 31.4 PG (ref 26.5–33)
MCHC RBC AUTO-ENTMCNC: 33.2 G/DL (ref 31.5–36.5)
MCV RBC AUTO: 95 FL (ref 78–100)
MONOCYTES # BLD AUTO: 0.6 10E9/L (ref 0–1.3)
MONOCYTES NFR BLD AUTO: 9 %
NEUTROPHILS # BLD AUTO: 4 10E9/L (ref 1.6–8.3)
NEUTROPHILS NFR BLD AUTO: 63.1 %
NRBC # BLD AUTO: 0 10*3/UL
NRBC BLD AUTO-RTO: 0 /100
PLATELET # BLD AUTO: 369 10E9/L (ref 150–450)
POTASSIUM SERPL-SCNC: 3.8 MMOL/L (ref 3.4–5.3)
PROT SERPL-MCNC: 7.4 G/DL (ref 6.8–8.8)
RBC # BLD AUTO: 4.36 10E12/L (ref 4.4–5.9)
SALICYLATES SERPL-MCNC: <2 MG/DL
SARS-COV-2 RNA RESP QL NAA+PROBE: NEGATIVE
SODIUM SERPL-SCNC: 138 MMOL/L (ref 133–144)
SPECIMEN SOURCE: NORMAL
WBC # BLD AUTO: 6.3 10E9/L (ref 4–11)

## 2021-07-08 PROCEDURE — 99285 EMERGENCY DEPT VISIT HI MDM: CPT | Performed by: EMERGENCY MEDICINE

## 2021-07-08 PROCEDURE — C9803 HOPD COVID-19 SPEC COLLECT: HCPCS | Performed by: EMERGENCY MEDICINE

## 2021-07-08 PROCEDURE — 99285 EMERGENCY DEPT VISIT HI MDM: CPT | Mod: 25 | Performed by: EMERGENCY MEDICINE

## 2021-07-08 PROCEDURE — 250N000013 HC RX MED GY IP 250 OP 250 PS 637: Performed by: EMERGENCY MEDICINE

## 2021-07-08 PROCEDURE — 87635 SARS-COV-2 COVID-19 AMP PRB: CPT | Performed by: EMERGENCY MEDICINE

## 2021-07-08 PROCEDURE — 80143 DRUG ASSAY ACETAMINOPHEN: CPT | Performed by: EMERGENCY MEDICINE

## 2021-07-08 PROCEDURE — 80179 DRUG ASSAY SALICYLATE: CPT | Performed by: EMERGENCY MEDICINE

## 2021-07-08 PROCEDURE — 85025 COMPLETE CBC W/AUTO DIFF WBC: CPT | Performed by: EMERGENCY MEDICINE

## 2021-07-08 PROCEDURE — 124N000002 HC R&B MH UMMC

## 2021-07-08 PROCEDURE — 80053 COMPREHEN METABOLIC PANEL: CPT | Performed by: EMERGENCY MEDICINE

## 2021-07-08 RX ORDER — TRAZODONE HYDROCHLORIDE 50 MG/1
50 TABLET, FILM COATED ORAL
Status: DISCONTINUED | OUTPATIENT
Start: 2021-07-08 | End: 2021-07-09 | Stop reason: HOSPADM

## 2021-07-08 RX ORDER — HYDROXYZINE HYDROCHLORIDE 25 MG/1
25 TABLET, FILM COATED ORAL EVERY 4 HOURS PRN
Status: DISCONTINUED | OUTPATIENT
Start: 2021-07-08 | End: 2021-07-09 | Stop reason: HOSPADM

## 2021-07-08 RX ORDER — OLANZAPINE 10 MG/1
10 TABLET, ORALLY DISINTEGRATING ORAL ONCE
Status: COMPLETED | OUTPATIENT
Start: 2021-07-08 | End: 2021-07-08

## 2021-07-08 RX ORDER — ALPRAZOLAM 0.5 MG
2 TABLET ORAL 3 TIMES DAILY PRN
Status: DISCONTINUED | OUTPATIENT
Start: 2021-07-08 | End: 2021-07-09

## 2021-07-08 RX ORDER — MAGNESIUM HYDROXIDE/ALUMINUM HYDROXICE/SIMETHICONE 120; 1200; 1200 MG/30ML; MG/30ML; MG/30ML
30 SUSPENSION ORAL EVERY 4 HOURS PRN
Status: DISCONTINUED | OUTPATIENT
Start: 2021-07-08 | End: 2021-07-09 | Stop reason: HOSPADM

## 2021-07-08 RX ORDER — AMOXICILLIN 875 MG
875 TABLET ORAL 2 TIMES DAILY
Status: ON HOLD | COMMUNITY
Start: 2021-07-04 | End: 2021-07-13

## 2021-07-08 RX ORDER — NICOTINE 21 MG/24HR
1 PATCH, TRANSDERMAL 24 HOURS TRANSDERMAL DAILY
Status: DISCONTINUED | OUTPATIENT
Start: 2021-07-09 | End: 2021-07-09 | Stop reason: HOSPADM

## 2021-07-08 RX ORDER — AMOXICILLIN 250 MG
1 CAPSULE ORAL 2 TIMES DAILY PRN
Status: DISCONTINUED | OUTPATIENT
Start: 2021-07-08 | End: 2021-07-09 | Stop reason: HOSPADM

## 2021-07-08 RX ORDER — OLANZAPINE 10 MG/1
10 TABLET ORAL 3 TIMES DAILY PRN
Status: DISCONTINUED | OUTPATIENT
Start: 2021-07-08 | End: 2021-07-09 | Stop reason: HOSPADM

## 2021-07-08 RX ORDER — ACETAMINOPHEN 325 MG/1
650 TABLET ORAL EVERY 4 HOURS PRN
Status: DISCONTINUED | OUTPATIENT
Start: 2021-07-08 | End: 2021-07-09 | Stop reason: HOSPADM

## 2021-07-08 RX ORDER — AMOXICILLIN 875 MG
875 TABLET ORAL 2 TIMES DAILY
Status: DISCONTINUED | OUTPATIENT
Start: 2021-07-09 | End: 2021-07-09 | Stop reason: HOSPADM

## 2021-07-08 RX ORDER — BUSPIRONE HYDROCHLORIDE 15 MG/1
45 TABLET ORAL DAILY
Status: DISCONTINUED | OUTPATIENT
Start: 2021-07-09 | End: 2021-07-08

## 2021-07-08 RX ORDER — DIPHENHYDRAMINE HCL 25 MG
25-50 CAPSULE ORAL EVERY 6 HOURS PRN
Status: DISCONTINUED | OUTPATIENT
Start: 2021-07-08 | End: 2021-07-09 | Stop reason: HOSPADM

## 2021-07-08 RX ORDER — OLANZAPINE 10 MG/2ML
10 INJECTION, POWDER, FOR SOLUTION INTRAMUSCULAR 3 TIMES DAILY PRN
Status: DISCONTINUED | OUTPATIENT
Start: 2021-07-08 | End: 2021-07-09 | Stop reason: HOSPADM

## 2021-07-08 RX ORDER — HYDROCODONE BITARTRATE AND ACETAMINOPHEN 5; 325 MG/1; MG/1
1 TABLET ORAL EVERY 6 HOURS PRN
Status: ON HOLD | COMMUNITY
Start: 2021-07-04 | End: 2021-07-12

## 2021-07-08 RX ORDER — HALOPERIDOL 2 MG/1
2 TABLET ORAL EVERY 6 HOURS PRN
Status: DISCONTINUED | OUTPATIENT
Start: 2021-07-08 | End: 2021-07-09 | Stop reason: HOSPADM

## 2021-07-08 RX ORDER — HALOPERIDOL 5 MG/ML
2 INJECTION INTRAMUSCULAR EVERY 6 HOURS PRN
Status: DISCONTINUED | OUTPATIENT
Start: 2021-07-08 | End: 2021-07-09 | Stop reason: HOSPADM

## 2021-07-08 RX ORDER — DIPHENHYDRAMINE HYDROCHLORIDE 50 MG/ML
25-50 INJECTION INTRAMUSCULAR; INTRAVENOUS EVERY 6 HOURS PRN
Status: DISCONTINUED | OUTPATIENT
Start: 2021-07-08 | End: 2021-07-09 | Stop reason: HOSPADM

## 2021-07-08 RX ADMIN — OLANZAPINE 10 MG: 10 TABLET, ORALLY DISINTEGRATING ORAL at 22:05

## 2021-07-08 RX ADMIN — ALPRAZOLAM 2 MG: 0.5 TABLET ORAL at 20:24

## 2021-07-08 ASSESSMENT — ACTIVITIES OF DAILY LIVING (ADL)
LAUNDRY: UNABLE TO COMPLETE
HYGIENE/GROOMING: INDEPENDENT
DRESS: INDEPENDENT;SCRUBS (BEHAVIORAL HEALTH)
ORAL_HYGIENE: INDEPENDENT

## 2021-07-08 ASSESSMENT — MIFFLIN-ST. JEOR: SCORE: 1544.59

## 2021-07-08 NOTE — ED TRIAGE NOTES
Pt was asked to come to ER, pt unable to provide much information in triage, some faraz dropped him off and individual left.  Pt denies any drug use although appears to be very altered

## 2021-07-08 NOTE — ED NOTES
Rn again attempted to draw blood from patient but he refused. Calm standing by doorway talking about a court date for child support.
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
## 2021-07-08 NOTE — PHARMACY-ADMISSION MEDICATION HISTORY
Admission Medication History Completed by Pharmacy    See Psychiatric Admission Navigator for allergy information, preferred outpatient pharmacy, prior to admission medications and immunization status.     Medication History Sources:     [facility] and Chart Review only     Changes made to PTA medication list (reason):    Added: Per Care Everywhere  o Hydrocodone-Acetaminophen 5-325 mg tablet  o Amoxicillin 875 mg tablet     Deleted: None    Changed: Per [facility]   o Buspirone 15 mg tablet: 2 tablets daily --> Buspirone 15 mg tablet: 3 tablets daily     Additional Information:    None    Prior to Admission medications    Medication Sig Last Dose Taking? Auth Provider   amoxicillin (AMOXIL) 875 MG tablet Take 875 mg by mouth 2 times daily for 10 days  Yes Reported, Patient   HYDROcodone-acetaminophen (NORCO) 5-325 MG tablet Take 1 tablet by mouth every 6 hours as needed for pain  Yes Reported, Patient   ALPRAZolam (XANAX) 2 MG tablet Take 1 tablet by mouth 3 times daily   Reported, Patient   busPIRone (BUSPAR) 15 MG tablet Take 3 tablets by mouth daily    Reported, Patient   omeprazole (PRILOSEC) 20 MG DR capsule TAKE 1 CAPSULE(20 MG) BY MOUTH DAILY   Taco Armas PA-C       Date completed: 07/08/21    Medication history completed by: Kary Parish - Pharmacy Intern

## 2021-07-09 ENCOUNTER — HOSPITAL ENCOUNTER (INPATIENT)
Facility: CLINIC | Age: 31
LOS: 3 days | Discharge: PSYCHIATRIC OR SUBSTANCE USE FACILITY | End: 2021-07-12
Attending: STUDENT IN AN ORGANIZED HEALTH CARE EDUCATION/TRAINING PROGRAM | Admitting: HOSPITALIST
Payer: COMMERCIAL

## 2021-07-09 DIAGNOSIS — F19.959 DRUG-INDUCED PSYCHOTIC DISORDER WITH COMPLICATION (H): ICD-10-CM

## 2021-07-09 DIAGNOSIS — R41.82 ALTERED MENTAL STATUS, UNSPECIFIED ALTERED MENTAL STATUS TYPE: Primary | ICD-10-CM

## 2021-07-09 PROBLEM — F29 PSYCHOSIS (H): Status: ACTIVE | Noted: 2021-07-09

## 2021-07-09 LAB
ALBUMIN SERPL-MCNC: 3.4 G/DL (ref 3.4–5)
ALP SERPL-CCNC: 89 U/L (ref 40–150)
ALT SERPL W P-5'-P-CCNC: 27 U/L (ref 0–70)
AMPHETAMINES UR QL SCN: POSITIVE
ANION GAP SERPL CALCULATED.3IONS-SCNC: 8 MMOL/L (ref 3–14)
AST SERPL W P-5'-P-CCNC: 33 U/L (ref 0–45)
BARBITURATES UR QL: NEGATIVE
BENZODIAZ UR QL: POSITIVE
BILIRUB SERPL-MCNC: 0.7 MG/DL (ref 0.2–1.3)
BUN SERPL-MCNC: 12 MG/DL (ref 7–30)
CALCIUM SERPL-MCNC: 8.7 MG/DL (ref 8.5–10.1)
CANNABINOIDS UR QL SCN: POSITIVE
CHLORIDE SERPL-SCNC: 112 MMOL/L (ref 94–109)
CK SERPL-CCNC: 637 U/L (ref 30–300)
CO2 SERPL-SCNC: 21 MMOL/L (ref 20–32)
COCAINE UR QL: NEGATIVE
CREAT SERPL-MCNC: 0.73 MG/DL (ref 0.66–1.25)
ETHANOL UR QL SCN: NEGATIVE
GFR SERPL CREATININE-BSD FRML MDRD: >90 ML/MIN/{1.73_M2}
GLUCOSE BLDC GLUCOMTR-MCNC: 102 MG/DL (ref 70–99)
GLUCOSE BLDC GLUCOMTR-MCNC: 106 MG/DL (ref 70–99)
GLUCOSE BLDC GLUCOMTR-MCNC: 108 MG/DL (ref 70–99)
GLUCOSE BLDC GLUCOMTR-MCNC: 138 MG/DL (ref 70–99)
GLUCOSE BLDC GLUCOMTR-MCNC: 77 MG/DL (ref 70–99)
GLUCOSE SERPL-MCNC: 130 MG/DL (ref 70–99)
MAGNESIUM SERPL-MCNC: 2 MG/DL (ref 1.6–2.3)
OPIATES UR QL SCN: NEGATIVE
POTASSIUM SERPL-SCNC: 3.9 MMOL/L (ref 3.4–5.3)
PROT SERPL-MCNC: 6.8 G/DL (ref 6.8–8.8)
SODIUM SERPL-SCNC: 141 MMOL/L (ref 133–144)
T4 FREE SERPL-MCNC: 1.19 NG/DL (ref 0.76–1.46)
TSH SERPL DL<=0.005 MIU/L-ACNC: 0.17 MU/L (ref 0.4–4)

## 2021-07-09 PROCEDURE — 99291 CRITICAL CARE FIRST HOUR: CPT | Performed by: NURSE PRACTITIONER

## 2021-07-09 PROCEDURE — 80168 ASSAY OF ETHOSUXIMIDE: CPT | Performed by: NURSE PRACTITIONER

## 2021-07-09 PROCEDURE — 250N000013 HC RX MED GY IP 250 OP 250 PS 637: Performed by: NURSE PRACTITIONER

## 2021-07-09 PROCEDURE — 999N000216 HC STATISTIC ADULT CD FACE TO FACE-NO CHRG

## 2021-07-09 PROCEDURE — 80320 DRUG SCREEN QUANTALCOHOLS: CPT | Performed by: NURSE PRACTITIONER

## 2021-07-09 PROCEDURE — 200N000002 HC R&B ICU UMMC

## 2021-07-09 PROCEDURE — 250N000011 HC RX IP 250 OP 636: Performed by: NURSE PRACTITIONER

## 2021-07-09 PROCEDURE — 999N001017 HC STATISTIC GLUCOSE BY METER IP

## 2021-07-09 PROCEDURE — 84439 ASSAY OF FREE THYROXINE: CPT | Performed by: NURSE PRACTITIONER

## 2021-07-09 PROCEDURE — 36415 COLL VENOUS BLD VENIPUNCTURE: CPT | Performed by: NURSE PRACTITIONER

## 2021-07-09 PROCEDURE — 82550 ASSAY OF CK (CPK): CPT | Performed by: NURSE PRACTITIONER

## 2021-07-09 PROCEDURE — 250N000009 HC RX 250: Performed by: NURSE PRACTITIONER

## 2021-07-09 PROCEDURE — 80053 COMPREHEN METABOLIC PANEL: CPT | Performed by: NURSE PRACTITIONER

## 2021-07-09 PROCEDURE — 83735 ASSAY OF MAGNESIUM: CPT | Performed by: NURSE PRACTITIONER

## 2021-07-09 PROCEDURE — 93005 ELECTROCARDIOGRAM TRACING: CPT

## 2021-07-09 PROCEDURE — 80307 DRUG TEST PRSMV CHEM ANLYZR: CPT | Performed by: NURSE PRACTITIONER

## 2021-07-09 PROCEDURE — 258N000003 HC RX IP 258 OP 636: Performed by: NURSE PRACTITIONER

## 2021-07-09 PROCEDURE — 93010 ELECTROCARDIOGRAM REPORT: CPT | Performed by: INTERNAL MEDICINE

## 2021-07-09 PROCEDURE — 99221 1ST HOSP IP/OBS SF/LOW 40: CPT | Performed by: NURSE PRACTITIONER

## 2021-07-09 PROCEDURE — 84443 ASSAY THYROID STIM HORMONE: CPT | Performed by: NURSE PRACTITIONER

## 2021-07-09 PROCEDURE — 99207 PR CONSULT E&M CHANGED TO INITIAL LEVEL: CPT | Performed by: NURSE PRACTITIONER

## 2021-07-09 RX ORDER — LORAZEPAM 2 MG/ML
2 INJECTION INTRAMUSCULAR
Status: COMPLETED | OUTPATIENT
Start: 2021-07-09 | End: 2021-07-09

## 2021-07-09 RX ORDER — AMOXICILLIN 875 MG
875 TABLET ORAL 2 TIMES DAILY
Status: DISCONTINUED | OUTPATIENT
Start: 2021-07-09 | End: 2021-07-12 | Stop reason: HOSPADM

## 2021-07-09 RX ORDER — BUSPIRONE HYDROCHLORIDE 15 MG/1
15 TABLET ORAL 3 TIMES DAILY
Status: DISCONTINUED | OUTPATIENT
Start: 2021-07-09 | End: 2021-07-12 | Stop reason: HOSPADM

## 2021-07-09 RX ORDER — LORAZEPAM 0.5 MG/1
1-4 TABLET ORAL EVERY 30 MIN PRN
Status: CANCELLED | OUTPATIENT
Start: 2021-07-09

## 2021-07-09 RX ORDER — ALPRAZOLAM 0.5 MG
1 TABLET ORAL EVERY 8 HOURS
Status: DISCONTINUED | OUTPATIENT
Start: 2021-07-09 | End: 2021-07-12 | Stop reason: HOSPADM

## 2021-07-09 RX ORDER — ACETAMINOPHEN 325 MG/1
650 TABLET ORAL EVERY 4 HOURS PRN
Status: DISCONTINUED | OUTPATIENT
Start: 2021-07-09 | End: 2021-07-12 | Stop reason: HOSPADM

## 2021-07-09 RX ORDER — DIPHENHYDRAMINE HYDROCHLORIDE 50 MG/ML
50 INJECTION INTRAMUSCULAR; INTRAVENOUS EVERY 6 HOURS PRN
Status: CANCELLED | OUTPATIENT
Start: 2021-07-09

## 2021-07-09 RX ORDER — HALOPERIDOL 5 MG/ML
5 INJECTION INTRAMUSCULAR EVERY 6 HOURS PRN
Status: DISCONTINUED | OUTPATIENT
Start: 2021-07-09 | End: 2021-07-12 | Stop reason: HOSPADM

## 2021-07-09 RX ORDER — OLANZAPINE 5 MG/1
5 TABLET, ORALLY DISINTEGRATING ORAL 2 TIMES DAILY
Status: DISCONTINUED | OUTPATIENT
Start: 2021-07-09 | End: 2021-07-12 | Stop reason: HOSPADM

## 2021-07-09 RX ORDER — SODIUM CHLORIDE 9 MG/ML
INJECTION, SOLUTION INTRAVENOUS CONTINUOUS
Status: DISCONTINUED | OUTPATIENT
Start: 2021-07-09 | End: 2021-07-09

## 2021-07-09 RX ORDER — OLANZAPINE 5 MG/1
10 TABLET, ORALLY DISINTEGRATING ORAL
Status: DISCONTINUED | OUTPATIENT
Start: 2021-07-09 | End: 2021-07-12 | Stop reason: HOSPADM

## 2021-07-09 RX ORDER — LORAZEPAM 2 MG/ML
2 INJECTION INTRAMUSCULAR EVERY 4 HOURS PRN
Status: DISCONTINUED | OUTPATIENT
Start: 2021-07-09 | End: 2021-07-12 | Stop reason: HOSPADM

## 2021-07-09 RX ORDER — DEXTROSE MONOHYDRATE 25 G/50ML
25-50 INJECTION, SOLUTION INTRAVENOUS
Status: DISCONTINUED | OUTPATIENT
Start: 2021-07-09 | End: 2021-07-12 | Stop reason: HOSPADM

## 2021-07-09 RX ORDER — HALOPERIDOL 5 MG/ML
5 INJECTION INTRAMUSCULAR ONCE
Status: COMPLETED | OUTPATIENT
Start: 2021-07-09 | End: 2021-07-09

## 2021-07-09 RX ORDER — LORAZEPAM 1 MG/1
2 TABLET ORAL
Status: DISCONTINUED | OUTPATIENT
Start: 2021-07-09 | End: 2021-07-09 | Stop reason: HOSPADM

## 2021-07-09 RX ORDER — NICOTINE POLACRILEX 4 MG
15-30 LOZENGE BUCCAL
Status: DISCONTINUED | OUTPATIENT
Start: 2021-07-09 | End: 2021-07-12 | Stop reason: HOSPADM

## 2021-07-09 RX ORDER — ONDANSETRON 2 MG/ML
4 INJECTION INTRAMUSCULAR; INTRAVENOUS EVERY 6 HOURS PRN
Status: DISCONTINUED | OUTPATIENT
Start: 2021-07-09 | End: 2021-07-12 | Stop reason: HOSPADM

## 2021-07-09 RX ORDER — HALOPERIDOL 5 MG/ML
5 INJECTION INTRAMUSCULAR EVERY 6 HOURS
Status: DISCONTINUED | OUTPATIENT
Start: 2021-07-09 | End: 2021-07-09

## 2021-07-09 RX ORDER — BUSPIRONE HYDROCHLORIDE 15 MG/1
45 TABLET ORAL DAILY
Status: DISCONTINUED | OUTPATIENT
Start: 2021-07-09 | End: 2021-07-09

## 2021-07-09 RX ORDER — HALOPERIDOL 5 MG/ML
5 INJECTION INTRAMUSCULAR EVERY 6 HOURS PRN
Status: DISCONTINUED | OUTPATIENT
Start: 2021-07-09 | End: 2021-07-09

## 2021-07-09 RX ADMIN — LORAZEPAM 2 MG: 2 INJECTION INTRAMUSCULAR; INTRAVENOUS at 03:45

## 2021-07-09 RX ADMIN — SODIUM CHLORIDE: 9 INJECTION, SOLUTION INTRAVENOUS at 15:12

## 2021-07-09 RX ADMIN — ALPRAZOLAM 1 MG: 0.5 TABLET ORAL at 17:11

## 2021-07-09 RX ADMIN — OLANZAPINE 5 MG: 5 TABLET, ORALLY DISINTEGRATING ORAL at 21:37

## 2021-07-09 RX ADMIN — DEXMEDETOMIDINE HYDROCHLORIDE 0.2 MCG/KG/HR: 100 INJECTION, SOLUTION INTRAVENOUS at 05:06

## 2021-07-09 RX ADMIN — HALOPERIDOL LACTATE 5 MG: 5 INJECTION, SOLUTION INTRAMUSCULAR at 03:12

## 2021-07-09 RX ADMIN — HALOPERIDOL LACTATE 5 MG: 5 INJECTION, SOLUTION INTRAMUSCULAR at 09:36

## 2021-07-09 RX ADMIN — ONDANSETRON 4 MG: 2 INJECTION INTRAMUSCULAR; INTRAVENOUS at 20:10

## 2021-07-09 RX ADMIN — LORAZEPAM 2 MG: 2 INJECTION INTRAMUSCULAR; INTRAVENOUS at 21:57

## 2021-07-09 RX ADMIN — LORAZEPAM 2 MG: 2 INJECTION INTRAMUSCULAR; INTRAVENOUS at 07:15

## 2021-07-09 RX ADMIN — HALOPERIDOL LACTATE 5 MG: 5 INJECTION, SOLUTION INTRAMUSCULAR at 23:44

## 2021-07-09 RX ADMIN — ALPRAZOLAM 1 MG: 0.5 TABLET ORAL at 23:44

## 2021-07-09 RX ADMIN — SODIUM CHLORIDE: 9 INJECTION, SOLUTION INTRAVENOUS at 05:07

## 2021-07-09 RX ADMIN — AMOXICILLIN 875 MG: 875 TABLET, FILM COATED ORAL at 23:52
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
## 2021-07-09 NOTE — PROGRESS NOTES
07/08/21 2233   Patient Belongings   Patient Belongings locker   Patient Belongings Put in Hospital Secure Location (Security or Locker, etc.) clothing;plastic bag;shoes   Belongings Search Yes   Clothing Search Yes   Second Staff Artem HERNANDEZ (Station [facility] Psychiatric Associate)     Pt came in with shoes, pants, key on a string and cigarettes; these items were placed in pt locker #15.    Pt had ZERO medications, Cash or Credit Cards, with him upon admission and therefore ZERO items were sent to hospital security.    A               Admission:  I am responsible for any personal items that are not sent to the safe or pharmacy.  [facility] is not responsible for loss, theft or damage of any property in my possession.    Signature:  _________________________________ Date: _______  Time: _____                                              Staff Signature:  ____________________________ Date: ________  Time: _____      2nd Staff person, if patient is unable/unwilling to sign:    Signature: ________________________________ Date: ________  Time: _____     Discharge:  [facility] has returned all of my personal belongings:    Signature: _________________________________ Date: ________  Time: _____                                          Staff Signature:  ____________________________ Date: ________  Time: _____

## 2021-07-09 NOTE — ED NOTES
[facility] ED Mental Health Handoff Note:       Brief HPI:  This is a 30 year old male signed out to me by Dr. Turpin.  See initial ED Provider note for full details of the presentation. The patient is a 30-year-old male who presents to the emergency department with altered mental status, drug use, paranoid behaviors. Patient is pending admit to mental health unit. He is on a 72-hour hold..    Home meds reviewed and ordered/administered: No    Medically stable for inpatient mental health admission: Yes.    Evaluated by mental health: Yes. The recommendation is for inpatient mental health treatment. Bed search in process    Safety concerns: At the time I received sign out, The patient has been intermittently agitated, but has not needed any medications to control his symptoms.    Hold Status:  Active Orders   Legal    Emergency Hospitalization Hold (72 Hr Hold)     Frequency: Effective Now     Start Date/Time: 07/08/21 1509      Number of Occurrences: Until Specified    Health Officer Authority to Detain (NIESHA)     Frequency: Effective Now     Start Date/Time: 07/08/21 1124      Number of Occurrences: Until Specified     Order Comments: This patient presented with an altered mental state that is suspected to be due to an intoxicating substance. The level of mentation is such that abuse of this substance is suspected. Given the patient's level of alteration and the circumstances in which the patient presented, it is likely that the patient utilizes intoxicating substances on a regular basis or has relapsed into utilizing them after a period of attempted sobriety. Given these circumstances, I am highly concerned that the patient has a problem with chemical dependency and cannot make safe decisions at this time. Currently, this endangers the patient's well-being and safety, and I am thus placing a Health Officer Authority hold upon the patient at this time.              Exam:   Patient Vitals for the past 24  hrs:   BP Temp Temp src Pulse Resp SpO2   07/08/21 1411 125/82 98.4  F (36.9  C) Oral 95 16 100 %             ED Course:    Medications   OLANZapine zydis (zyPREXA) ODT tab 10 mg (has no administration in time range)   ALPRAZolam (XANAX) tablet 2 mg (has no administration in time range)   busPIRone (BUSPAR) tablet 45 mg (has no administration in time range)            There were no significant events during my shift.    I ordered patient's home medications after they were verified by pharmacy.     Pt continued to be mildly agitated while in ER, was offered PO Zyprexa, which he repeatedly declined.    Patient to be signed out to the oncoming provider, Dr. Arredondo      Impression:    ICD-10-CM    1. Altered mental status, unspecified altered mental status type  R41.82 Comprehensive metabolic panel     Salicylate level     Acetaminophen level     Asymptomatic SARS-CoV-2 COVID-19 Virus (Coronavirus) by PCR   2. Drug-induced psychotic disorder with complication (H)  F19.959        Plan:    1. Awaiting inpatient mental health admission/transfer.      RESULTS:   Results for orders placed or performed during the hospital encounter of 07/08/21 (from the past 24 hour(s))   CBC with platelets differential     Status: Abnormal    Collection Time: 07/08/21  2:06 PM   Result Value Ref Range    WBC 6.3 4.0 - 11.0 10e9/L    RBC Count 4.36 (L) 4.4 - 5.9 10e12/L    Hemoglobin 13.7 13.3 - 17.7 g/dL    Hematocrit 41.3 40.0 - 53.0 %    MCV 95 78 - 100 fl    MCH 31.4 26.5 - 33.0 pg    MCHC 33.2 31.5 - 36.5 g/dL    RDW 12.2 10.0 - 15.0 %    Platelet Count 369 150 - 450 10e9/L    Diff Method Automated Method     % Neutrophils 63.1 %    % Lymphocytes 22.2 %    % Monocytes 9.0 %    % Eosinophils 4.8 %    % Basophils 0.6 %    % Immature Granulocytes 0.3 %    Nucleated RBCs 0 0 /100    Absolute Neutrophil 4.0 1.6 - 8.3 10e9/L    Absolute Lymphocytes 1.4 0.8 - 5.3 10e9/L    Absolute Monocytes 0.6 0.0 - 1.3 10e9/L    Absolute Eosinophils 0.3 0.0  - 0.7 10e9/L    Absolute Basophils 0.0 0.0 - 0.2 10e9/L    Abs Immature Granulocytes 0.0 0 - 0.4 10e9/L    Absolute Nucleated RBC 0.0    Comprehensive metabolic panel     Status: Abnormal    Collection Time: 07/08/21  2:06 PM   Result Value Ref Range    Sodium 138 133 - 144 mmol/L    Potassium 3.8 3.4 - 5.3 mmol/L    Chloride 108 94 - 109 mmol/L    Carbon Dioxide 29 20 - 32 mmol/L    Anion Gap 1 (L) 3 - 14 mmol/L    Glucose 102 (H) 70 - 99 mg/dL    Urea Nitrogen 17 7 - 30 mg/dL    Creatinine 0.87 0.66 - 1.25 mg/dL    GFR Estimate >90 >60 mL/min/[1.73_m2]    GFR Estimate If Black >90 >60 mL/min/[1.73_m2]    Calcium 8.9 8.5 - 10.1 mg/dL    Bilirubin Total 0.6 0.2 - 1.3 mg/dL    Albumin 3.7 3.4 - 5.0 g/dL    Protein Total 7.4 6.8 - 8.8 g/dL    Alkaline Phosphatase 100 40 - 150 U/L    ALT 27 0 - 70 U/L    AST 25 0 - 45 U/L   Salicylate level     Status: None    Collection Time: 07/08/21  2:06 PM   Result Value Ref Range    Salicylate Level <2 mg/dL   Acetaminophen level     Status: None    Collection Time: 07/08/21  2:06 PM   Result Value Ref Range    Acetaminophen Level <2 mg/L   Asymptomatic SARS-CoV-2 COVID-19 Virus (Coronavirus) by PCR     Status: None    Collection Time: 07/08/21  2:56 PM    Specimen: Nasopharyngeal   Result Value Ref Range    SARS-CoV-2 Virus Specimen Source Nasopharyngeal     SARS-CoV-2 PCR Result NEGATIVE     SARS-CoV-2 PCR Comment (Note)              MD Helio Richards Michelle C, MD  07/08/21 2012

## 2021-07-09 NOTE — PROGRESS NOTES
CLINICAL NUTRITION SERVICES - ASSESSMENT NOTE     Nutrition Prescription    RECOMMENDATIONS FOR MDs/PROVIDERS TO ORDER:  Recommend close monitoring of electrolytes, particularly if patient begins to eat a significant amount    Malnutrition Status:    Severe malnutrition in the context of chronic illness    Recommendations already ordered by Registered Dietitian (RD):  Continue current diet orders  Order Thera-LISA multivitamin    Future/Additional Recommendations:  Monitor intake, weight, need for supplementation     REASON FOR ASSESSMENT  Charles Chapa is a/an 31 year old male assessed by the dietitian for low BMI, weight loss per chart review.  Pt admitted d/t AMS in the setting of psychosis and polysubstance use. Pt transferred to ICU d/t intermittent need for precedex drip.    NUTRITION HISTORY  - Unable to obtain nutrition history due to AMS.  - pt resides with mother, but she has been in [facility]    CURRENT NUTRITION ORDERS  Diet: Regular, no pork  Intake/Tolerance: none until AMS improves    LABS  Labs reviewed    Electrolytes  Sodium (mmol/L)   Date Value   07/09/2021 141   07/08/2021 138   09/07/2018 141     Potassium (mmol/L)   Date Value   07/09/2021 3.9   07/08/2021 3.8   09/07/2018 3.7     Magnesium (mg/dL)   Date Value   07/09/2021 2.0    Renal  Urea Nitrogen (mg/dL)   Date Value   07/09/2021 12   07/08/2021 17   09/07/2018 11     Creatinine (mg/dL)   Date Value   07/09/2021 0.73   07/08/2021 0.87   09/07/2018 0.98     Inflammatory Markers  WBC (10e9/L)   Date Value   07/08/2021 6.3   09/07/2018 7.0     Albumin (g/dL)   Date Value   07/09/2021 3.4   07/08/2021 3.7   09/07/2018 3.4      Blood Glucose  Glucose (mg/dL)   Date Value   07/09/2021 130 (H)   07/08/2021 102 (H)   09/07/2018 81    Hepatic  ALT (U/L)   Date Value   07/09/2021 27     AST (U/L)   Date Value   07/09/2021 33     Alkaline Phosphatase (U/L)   Date Value   07/09/2021 89     Bilirubin Total (mg/dL)   Date Value   07/09/2021 0.7     "Additional  No results found for: TRIG, URINEKETONE       MEDICATIONS    [Held by provider] ALPRAZolam  1 mg Oral Q8H     [Held by provider] amoxicillin  875 mg Oral BID     [Held by provider] busPIRone  15 mg Oral TID     haloperidol lactate  5 mg Intravenous Q6H     [Held by provider] omeprazole  20 mg Oral Daily        dexmedetomidine Stopped (07/09/21 0936)     sodium chloride 100 mL/hr at 07/09/21 0900        ANTHROPOMETRICS  Height: 188 cm (6' 2\")  Most Recent Weight: 51 kg (112 lb 6.4 oz)  IBW: 86.3 kg- current weight= 59% of IBW  BMI: 14.43 kg/m2  BMI: Underweight BMI <18.5  Weight History:   Wt Readings from Last 10 Encounters:   07/08/21 51 kg (112 lb 6.4 oz)   11/09/20 61.2 kg (135 lb)   03/05/20 56.6 kg (124 lb 12.5 oz)   09/07/18 57.2 kg (126 lb)   06/28/18 56.2 kg (124 lb)   03/27/18 57.6 kg (127 lb)   02/26/18 55.1 kg (121 lb 6.4 oz)     23 lb (17%) weight loss over past 8 months    Dosing Weight: 51 kg    ASSESSED NUTRITION NEEDS  Estimated Energy Needs: 3162-6762 kcals/day (35 - 40 kcals/kg)  Justification: Repletion and Underweight  Estimated Protein Needs: 61-77 grams protein/day (1.2 - 1.5 grams of pro/kg)  Justification: Repletion  Estimated Fluid Needs: 0248-2287 mL/day (1 mL/kcal)   Justification: Maintenance    MALNUTRITION  % Intake: Unable to assess  % Weight Loss: > 10% in 6 months (severe)  Subcutaneous Fat Loss: Facial region:  Orbital and buccal- moderate to severe  Muscle Loss: Temporal:  Moderate--difficult to do full assessment d/t agitation and restraints  Fluid Accumulation/Edema: None noted  Malnutrition Diagnosis: Severe malnutrition in the context of chronic illness    NUTRITION DIAGNOSIS  Underweight related to inadequate intake as evidenced by significant weight loss, s/s of muscle and fat loss      INTERVENTIONS  Implementation  Nutrition Education: Not appropriate at this time due to patient condition   Continue current diet orders  Order Thera-M " multivitamin    Goals  Patient to consume % of nutritionally adequate meal trays TID, or the equivalent with supplements/snacks.     Monitoring/Evaluation  Progress toward goals will be monitored and evaluated per protocol.  Bindu Juárez RD, LD  ICU/5A/OB/Mental Health Pager (M-F): [phone]  On Call Pager (weekends only): [phone]

## 2021-07-09 NOTE — ED NOTES
"Pt refused to watch inpt  video, stating he would throw it against the wall. Stating,\" I'd rather go to [facility]. They have better care than here.\" Refused vitals.  "

## 2021-07-09 NOTE — PLAN OF CARE
Report given to pt's RN on ICU.  Writer informed pt will be admitted to room 1032.  Pt has not received medication this shift.  Writer discussed this w/ hospitalist (as new order for ativan prn placed), however after discussion w/ hospitalist, it was felt attempting to medicate pt orally would only increase pt's agitation further.    Code green called at approximately 0222.  Pt was transferred on to [facility] and then [facility] w/ assist of code team in order to maintain pt's safety.  Decision was made to transfer pt to [facility] and [facility] as pt agitated, unsteady, disoriented and unable to be transferred in any other safe manner.  Pt left unit (discharged from Stn 30) at 0240.  Pt to be admitted to ICU 10.  Pt left unit with his belongings and staff which included his SIO 1:1 staff.

## 2021-07-09 NOTE — PLAN OF CARE
VS: /85   Pulse 67   Temp 98.2  F (36.8  C) (Oral)   Resp 21   SpO2 100%     O2: Room Air    Output: Pascual for intensive assessment, adequate output    Last BM: Unknown    Activity: Bedrest    Up for meals? No    Skin: CDI, full skin inspection completed. No skin issues noted.    Pain: Denies.    CMS: Intact    Dressing: None    Diet: Quaker - No pork    LDA: PIV left arm - SL    Equipment: None.    Plan: Continue to monitor. If remaining stable anticipate transfer back to psych tomorrow.    Additional Info: Patient was not oriented this morning. Started to wake up around 1600. Patient passed dysphagia screen at 1700 and was able to take oral pill. Patient was able to tell me today was his Birthday. Patient is disoriented to reason he is in ICU. Oriented patient regarding falls in psych unit. Patient refused CT scan despite rational, [facility] JAJA acknowledged refusal. Patient's Sister, Lucille, spoke with patient and will bring in some food this evening.

## 2021-07-09 NOTE — PLAN OF CARE
Admitted/transferred from: Station 30 psych unit at 0300  Reason for admission/transfer: Delirium, need for soft restraints, safety issue for self and others  Patient status upon admission/transfer: Restrained, accompanied by appx 10 staff members, yelling, paranoid, incoherent  Interventions: IM haldol, IM Ativan, 4 point soft restraints, 1:1 psych associate, PIV placed with 4 staff assist, precedex started, currently running at 1.2  Plan: Manage agitation, CT, EKG, manage withdrawal  2 RN skin assessment: unable to complete at this time  Result of skin assessment and interventions/actions:  Height, weight, drug calc weight: Not Done, pt too agitated  Patient belongings (see Flowsheet - Adult Profile for details): in 72 hour hold locker  MDRO education (if applicable): n/a

## 2021-07-09 NOTE — ED NOTES
[facility]   ED Nurse to Floor Handoff     Charles Chapa is a 30 year old male who speaks English and lives with family members,  in a home  They arrived in the ED by car from emergency room    ED Chief Complaint: Altered Mental Status (Pt brought in by friend, not able to communicatoin)    ED Dx;   Final diagnoses:   Altered mental status, unspecified altered mental status type   Drug-induced psychotic disorder with complication (H)         Needed?: No    Allergies: No Known Allergies.  Past Medical Hx:   Past Medical History:   Diagnosis Date     Substance abuse (H)       Baseline Mental status: WDL  Current Mental Status changes: impulsive and combative    Infection present or suspected this encounter: no  Sepsis suspected: No  Isolation type: No active isolations  Patient tested for COVID 19 prior to admission: YES     Activity level - Baseline/Home:  Independent  Activity Level - Current:   Independent    Bariatric equipment needed?: No    In the ED these meds were given:   Medications   ALPRAZolam (XANAX) tablet 2 mg (2 mg Oral Given 7/8/21 2024)   amoxicillin (AMOXIL) tablet 875 mg (has no administration in time range)   omeprazole (priLOSEC) CR capsule 20 mg (has no administration in time range)   acetaminophen (TYLENOL) tablet 650 mg (has no administration in time range)   alum & mag hydroxide-simethicone (MAALOX) suspension 30 mL (has no administration in time range)   senna-docusate (SENOKOT-S/PERICOLACE) 8.6-50 MG per tablet 1 tablet (has no administration in time range)   traZODone (DESYREL) tablet 50 mg (has no administration in time range)   hydrOXYzine (ATARAX) tablet 25 mg (has no administration in time range)   OLANZapine (zyPREXA) tablet 10 mg (has no administration in time range)     Or   OLANZapine (zyPREXA) injection 10 mg (has no administration in time range)   nicotine Patch in Place ( Transdermal Patch Free Period 7/8/21 2224)  "  nicotine (NICODERM CQ) 14 MG/24HR 24 hr patch 1 patch (has no administration in time range)   nicotine polacrilex (NICORETTE) gum 4 mg (has no administration in time range)   haloperidol (HALDOL) tablet 2 mg (has no administration in time range)     Or   haloperidol lactate (HALDOL) injection 2 mg (has no administration in time range)   diphenhydrAMINE (BENADRYL) capsule 25-50 mg (has no administration in time range)     Or   diphenhydrAMINE (BENADRYL) injection 25-50 mg (has no administration in time range)   OLANZapine zydis (zyPREXA) ODT tab 10 mg (10 mg Oral Given 7/8/21 2205)       Drips running?  No    Home pump  No    Current LDAs       Labs results:   Labs Ordered and Resulted from Time of ED Arrival Up to the Time of Departure from the ED   CBC WITH PLATELETS DIFFERENTIAL - Abnormal; Notable for the following components:       Result Value    RBC Count 4.36 (*)     All other components within normal limits   COMPREHENSIVE METABOLIC PANEL - Abnormal; Notable for the following components:    Anion Gap 1 (*)     Glucose 102 (*)     All other components within normal limits   SALICYLATE LEVEL   ACETAMINOPHEN LEVEL   SARS-COV-2 (COVID-19) VIRUS RT-PCR   DRUG ABUSE SCREEN 6 CHEM DEP URINE ([facility])   DOCUMENT IN LEGAL HOLD NAVIGATOR   DOCUMENT       Imaging Studies: No results found for this or any previous visit (from the past 24 hour(s)).    Recent vital signs:   /80   Pulse 98   Temp 98.3  F (36.8  C) (Oral)   Resp 16   Ht 1.88 m (6' 2\")   Wt 51 kg (112 lb 6.4 oz)   SpO2 100%   BMI 14.43 kg/m      Beatrice Coma Scale Score: 15 (07/08/21 2135)       Cardiac Rhythm: PT refused EKG  Pt needs tele? No  Skin/wound Issues: None    Code Status: Full Code    Pain control: pt had none    Nausea control: pt had none    Abnormal labs/tests/findings requiring intervention: N/A    Family present during ED course? No   Family Comments/Social Situation comments: Sister Lucille would like daily updates " [phone]    Tasks needing completion: EKG, urine screen    Emma Pate RN  [phone] [facility]  [phone] [facility] ED

## 2021-07-09 NOTE — PROGRESS NOTES
"Medicine Cross Cover Note    0030: Paged by Station 30 RN Cierra regarding patient agitation, concern for delirium requiring higher level of care on medicine floor as he has had escalating impulsivity, posing a danger to himself and others despite oral medications (olanzapine zydis 10mg, alprazolam 2mg PO). Station 30 does not have soft restraints available.    0100: Contacted patient placement, spoke with bed coordinator, med/surg nursing coordinator regarding bed availability, steps to transfer patient for safety purposes. At this time, only ICU bed available for his nursing and 2:1 supervision needs.     0130: Evaluated patient at bedside.   /80   Pulse 98   Temp 98.3  F (36.8  C) (Oral)   Resp 16   Ht 1.88 m (6' 2\")   Wt 51 kg (112 lb 6.4 oz)   SpO2 100%   BMI 14.43 kg/m    General: Thin, sitting on mattress on the ground  Head: no visible external trauma  Eyes: unable to safely perform pupil, EOM exam, no conjunctival injection  Resp: normal work of breathing. resp rate ~12  Neuro: somnolent, speech slurred, no facial droop,  moving all 4 extremities with 5/5 strength  Skin: non-diaphoretic  Psych: psychomotor agitation, flinches and becomes agitated when touched,     0145: Station 30 staff notify me that bed coordinators called to confirm that patient will be transferring to Medical ICU. Station 30 Discharge orders placed.     0200: Discussed with ICU JAJA Angelica. Since she is located on the unit and routinely admits patients to the unit with which I am unfamiliar, she will admit the patient once he arrives on the unit. She will also then be able to escalate management as needed throughout the night. If appropriate, he can transfer to Medicine hospitalist team tomorrow.     Plan:  - Unable to administer oral meds safely at this time  - Will not give IM meds unless agitation acutely worsens as this may temporarily exacerbate dangerous behavior prior to transport  - Transfer to ICU on wheelchair " with 2:1 supervision  - ICU JAJA Angelica will admit    Kim Calderon MD  Med-Peds Hospitalist   [phone]

## 2021-07-09 NOTE — PROVIDER NOTIFICATION
"Pt started the shift in bed w/ continuous movements, unable to remain still.  Pt agitated at this time \"F*ck you!\"  Pt ran into wall x3, unsteady on feet.    As shift progressed, pt now unable to get up independently and no longer able to ambulate.  Pt attempts to push himself off floor however is unable to do so.    Pt's mattress was brought to the floor. Since on floor, pt dove into nightstand x1. Pt also rolling around on floor and hitting head (lightly) against wooden bed frame. Seizure pads now in place all around pt and furniture/walls.    0010: On-call psych provider contacted d/t pt's behavior and writer asking for clarification regarding further medication as pt already unsteady/no longer able to ambulate.  On-call questioning/suspecting pt experiencing delrium (likely withdrawal?) and therefore asked writer to contact medicine before administering any further medication.    0030: Medical hospitalist contacted.  After reviewing pt's chart and speaking to writer, hospitalist reported decision is to move pt to medicine as this appears to be delirium (zydis at 2205 ineffective).      D/t pt being unable to provide utox, staff unclear what substance(s) pt withdrawing from.    Pt on SIO 1:1 at this time, discussion currently being had regarding changing pt to 2:1 as 2 staff w/ pt all times now in order to maintain safety.    Pt continues highly restless on floor.  Pt mumbling, slurring and drooling. At times pt yelling out.  "

## 2021-07-09 NOTE — CONSULTS
[facility], [place]   Initial Psychiatric Consult   Consult date: July 9, 2021         Reason for Consult, requesting source:    Psychosis, agitation, 72 hour hold  Requesting source: Kim Calderon        HPI:   Mr. Charles Chapa is a 31 year old male who was admitted to [facility] on 7/8/21 after presenting to the emergency department for evaluation of altered mental status. Pt has apparently not been sleeping, thinking that people are trying to kill him.  PMH significant for polysubstance use, including opiates, cannabis, amphetamines, and benzodiazepines. Hx of opioid overdose in December 2020, had been smoking fentanyl. Psychiatry is consulted for evaluation of psychosis and agitation.     Per chart review, patient has apparently been at home alone recently. Had been staying with mother but she is currently in [place]. The sister reported to staff that patient has not been sleeping, has been appearing increasingly paranoid, worried that people are trying to kill him. Had been walking around the house with a knife, trying to find these people. Sister reports a fam hx of schizoaffective disorder. Pt's utox was positive for amphetamines, cannabis, and benzos on admission.     Pt initially admitted to inpatient psychiatry; however, there was concern for delirium and agitation. Code green was called overnight, patient was then transferred to the ICU in restraints. Patient is prescribed alprazolam 2 mg TID, which he has been filling monthly per the PDMP database. Sister has expressed concern to the ICU staff that he has been selling the alprazolam. It is unclear how much he has been taking himself. He was placed on a Precedex drip overnight last night due to his agitation. He is currently unable to take medications by mouth. When attempting to wean Precedex, patient has become quite agitated.     On evaluation today, patient is seen lying in his hospital bed, sedated. His  eyes are open but he is not responsive at all. He is not rigid during passive ROM. He is quite cachectic appearing, with a BMI of 14.4.         Past Psychiatric History:   No known hx of psychiatric hospitalizations or suicide attempts. Appears he is seeing a psychiatrist named Carolina Mcguire MD. He is receiving a monthly prescription for Xanax 2 mg three times per day, last filled 7/5/21. Per sister, there is concern that he is diverting this medication. Appears he is also prescribed buspirone. No hx of ECT or MH commitment.         Substance Use and History:   Patient has history of polysubstance use. Had opioid overdoses in May and December 2020, requiring Narcan reversal. Utox on admission positive for amphetamines, benzodiazepines, and cannabinoids. Unclear if he has ever been to CD treatment.         Past Medical History:   PAST MEDICAL HISTORY:   Past Medical History:   Diagnosis Date     Substance abuse (H)        PAST SURGICAL HISTORY: No past surgical history on file.          Family History:   FAMILY HISTORY:   Family History   Problem Relation Age of Onset     Arthritis Mother      Diabetes Brother      Hypertension No family hx of    Per sister, family hx of schizoaffective disorder        Social History:   Pt apparently living at mother's home but mother is currently in [facility]. The chart suggests that he does not work. Has a sister in the [facility].          Physical ROS:   The patient was unable to participate in ROS due to sedation.          PTA Medications:     Medications Prior to Admission   Medication Sig Dispense Refill Last Dose     ALPRAZolam (XANAX) 2 MG tablet Take 1 tablet by mouth 3 times daily        amoxicillin (AMOXIL) 875 MG tablet Take 875 mg by mouth 2 times daily for 10 days        busPIRone (BUSPAR) 15 MG tablet Take 15 mg by mouth 3 times daily         HYDROcodone-acetaminophen (NORCO) 5-325 MG tablet Take 1 tablet by mouth every 6 hours as needed for pain        omeprazole  (PRILOSEC) 20 MG DR capsule TAKE 1 CAPSULE(20 MG) BY MOUTH DAILY 30 capsule 1           Allergies:   No Known Allergies       Labs:     Recent Results (from the past 48 hour(s))   CBC with platelets differential    Collection Time: 07/08/21  2:06 PM   Result Value Ref Range    WBC 6.3 4.0 - 11.0 10e9/L    RBC Count 4.36 (L) 4.4 - 5.9 10e12/L    Hemoglobin 13.7 13.3 - 17.7 g/dL    Hematocrit 41.3 40.0 - 53.0 %    MCV 95 78 - 100 fl    MCH 31.4 26.5 - 33.0 pg    MCHC 33.2 31.5 - 36.5 g/dL    RDW 12.2 10.0 - 15.0 %    Platelet Count 369 150 - 450 10e9/L    Diff Method Automated Method     % Neutrophils 63.1 %    % Lymphocytes 22.2 %    % Monocytes 9.0 %    % Eosinophils 4.8 %    % Basophils 0.6 %    % Immature Granulocytes 0.3 %    Nucleated RBCs 0 0 /100    Absolute Neutrophil 4.0 1.6 - 8.3 10e9/L    Absolute Lymphocytes 1.4 0.8 - 5.3 10e9/L    Absolute Monocytes 0.6 0.0 - 1.3 10e9/L    Absolute Eosinophils 0.3 0.0 - 0.7 10e9/L    Absolute Basophils 0.0 0.0 - 0.2 10e9/L    Abs Immature Granulocytes 0.0 0 - 0.4 10e9/L    Absolute Nucleated RBC 0.0    Comprehensive metabolic panel    Collection Time: 07/08/21  2:06 PM   Result Value Ref Range    Sodium 138 133 - 144 mmol/L    Potassium 3.8 3.4 - 5.3 mmol/L    Chloride 108 94 - 109 mmol/L    Carbon Dioxide 29 20 - 32 mmol/L    Anion Gap 1 (L) 3 - 14 mmol/L    Glucose 102 (H) 70 - 99 mg/dL    Urea Nitrogen 17 7 - 30 mg/dL    Creatinine 0.87 0.66 - 1.25 mg/dL    GFR Estimate >90 >60 mL/min/[1.73_m2]    GFR Estimate If Black >90 >60 mL/min/[1.73_m2]    Calcium 8.9 8.5 - 10.1 mg/dL    Bilirubin Total 0.6 0.2 - 1.3 mg/dL    Albumin 3.7 3.4 - 5.0 g/dL    Protein Total 7.4 6.8 - 8.8 g/dL    Alkaline Phosphatase 100 40 - 150 U/L    ALT 27 0 - 70 U/L    AST 25 0 - 45 U/L   Salicylate level    Collection Time: 07/08/21  2:06 PM   Result Value Ref Range    Salicylate Level <2 mg/dL   Acetaminophen level    Collection Time: 07/08/21  2:06 PM   Result Value Ref Range    Acetaminophen  Level <2 mg/L   Asymptomatic SARS-CoV-2 COVID-19 Virus (Coronavirus) by PCR    Collection Time: 07/08/21  2:56 PM    Specimen: Nasopharyngeal   Result Value Ref Range    SARS-CoV-2 Virus Specimen Source Nasopharyngeal     SARS-CoV-2 PCR Result NEGATIVE     SARS-CoV-2 PCR Comment (Note)    Glucose by meter    Collection Time: 07/09/21 12:02 AM   Result Value Ref Range    Glucose 108 (H) 70 - 99 mg/dL   Drug abuse screen 6 urine (chem dep)    Collection Time: 07/09/21  1:46 AM   Result Value Ref Range    Amphetamine Qual Urine Positive (A) NEG^Negative    Barbiturates Qual Urine Negative NEG^Negative    Benzodiazepine Qual Urine Positive (A) NEG^Negative    Cannabinoids Qual Urine Positive (A) NEG^Negative    Cocaine Qual Urine Negative NEG^Negative    Ethanol Qual Urine Negative NEG^Negative    Opiates Qualitative Urine Negative NEG^Negative   Comprehensive metabolic panel    Collection Time: 07/09/21  7:46 AM   Result Value Ref Range    Sodium 141 133 - 144 mmol/L    Potassium 3.9 3.4 - 5.3 mmol/L    Chloride 112 (H) 94 - 109 mmol/L    Carbon Dioxide 21 20 - 32 mmol/L    Anion Gap 8 3 - 14 mmol/L    Glucose 130 (H) 70 - 99 mg/dL    Urea Nitrogen 12 7 - 30 mg/dL    Creatinine 0.73 0.66 - 1.25 mg/dL    GFR Estimate >90 >60 mL/min/[1.73_m2]    GFR Estimate If Black >90 >60 mL/min/[1.73_m2]    Calcium 8.7 8.5 - 10.1 mg/dL    Bilirubin Total 0.7 0.2 - 1.3 mg/dL    Albumin 3.4 3.4 - 5.0 g/dL    Protein Total 6.8 6.8 - 8.8 g/dL    Alkaline Phosphatase 89 40 - 150 U/L    ALT 27 0 - 70 U/L    AST 33 0 - 45 U/L   Magnesium    Collection Time: 07/09/21  7:46 AM   Result Value Ref Range    Magnesium 2.0 1.6 - 2.3 mg/dL   CK total    Collection Time: 07/09/21  7:46 AM   Result Value Ref Range    CK Total 637 (H) 30 - 300 U/L   TSH with free T4 reflex    Collection Time: 07/09/21  7:46 AM   Result Value Ref Range    TSH 0.17 (L) 0.40 - 4.00 mU/L   T4 free    Collection Time: 07/09/21  7:46 AM   Result Value Ref Range    T4 Free  1.19 0.76 - 1.46 ng/dL   Glucose by meter    Collection Time: 07/09/21  8:37 AM   Result Value Ref Range    Glucose 138 (H) 70 - 99 mg/dL   EKG 12-lead, complete    Collection Time: 07/09/21 10:10 AM   Result Value Ref Range    Interpretation ECG Click View Image link to view waveform and result           Physical and Psychiatric Examination:     /81   Pulse 52   Temp 97.5  F (36.4  C) (Oral)   Resp 17   SpO2 94%   Weight is 0 lbs 0 oz  There is no height or weight on file to calculate BMI.    Physical Exam:  I have reviewed the physical exam as documented by by the medical team and agree with findings and assessment and have no additional findings to add at this time.    Mental Status Exam:    Appearance: age-appearing, dressed in orange scrubs, eyes open but sedated, poor dentition  Behavior: sedated, not responding to verbal stimuli  Orientation: unable to assess due to sedation  Movements: no movement observed, muscle tone is diminished  Mood: MIRIAM  Affect: flat, eyes and mouth open  Speech: not speaking  Memory: unable to assess  Fund of knowledge: unable to assess  Concentration: unable to assess  Thought process and content: unable to assess  Insight: unable to assess  Judgement: unable to assess  Safety: at risk for agitation, has become combative. No self-injurious behavior observed at this point.          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 Charles Chapa is a 31 year old male who was admitted to [facility] on 7/8/21 after presenting to the emergency department for evaluation of altered mental status. Pt has apparently not been sleeping, thinking that people are trying to kill him.  PMH significant for polysubstance use, including opiates, cannabis, amphetamines, and benzodiazepines. Hx of  opioid overdose in December 2020, had been smoking fentanyl.     Pt is currently sedated and unable to communicate. He is on a Precedex drip and has become agitated when attempting to wean. He is currently unable to take any medications by mouth. Appears he receives a prescription for alprazolam 2 mg TID from an outpatient psychiatrist. There was concern expressed by sister that he may be diverting this medication. It is unclear how much he has been taking himself. His presentation is likely a result of polysubstance use. The report from sister was that he had not been sleeping, had been walking around the house with a knife trying to find the people that he thinks are trying to kill him. This would be consistent with a methamphetamine-induced psychosis. Apparently there is a family hx of schizoaffective disorder, but given the fact that he has been using meth and various other substances, I think it is more likely that this is substance-induced. He does have a history of at least two opioid overdoses last year, requiring Narcan reversal. Does not appear that he has ever been committed.     Pt is very thin, which could also be related to meth use. Appears that in January 2020, patient weighed 127 lbs, so has lost about 15 pounds since that time. Will need to monitor his nutritional status. Serum protein and albumin are on the lower end of normal.     ECG completed this morning, QTc 477.     For now, recommend we administer IV haloperidol 5 mg every 6 hours in an attempt to wean the Precedex. He is not taking medications by mouth currently and does not have an NG tube so IV route would be the safest option. Will need to keep an eye on his QTc while using haloperidol, would recheck ECG tomorrow.     Will also need to monitor for benzodiazepine withdrawal as he has unfortunately been receiving large quantities of alprazolam by an outpatient psychiatrist in [place].           Summary of Recommendations:   1) For  now, would utilize IV haloperidol 5 mg q6h scheduled until we can successfully wean him off Precedex. Switch to oral when he is able to take by mouth. Would avoid anticholinergics as able as these can worsen delirium. If he is experiencing any dystonia, will need to administer diphenhydramine 50 mg IM or IV.     2) Recommend psychiatric reevaluation when he is awake and able to communicate.     3) Will need to monitor for benzodiazepine withdrawal given the large quantities of alprazolam he has been receiving - would start MSSA monitoring with symptom-triggered lorazepam po or IV    4) Will need to defer medical decision making to next-of-kin until he is awake and able to communicate decisions regarding his care    5) Recommend to continue 72 hour hold until he clears - this will be valid until Wednesday 7/14 at 3:30 AM    6) Hold buspirone until improving clinically    7) On-call psychiatrist will be available over the weekend. Please place a new consult if patient needs to be seen again.      ROSS Wren, CNP  [facility]   Contact information available via [facility] Paging/Directory

## 2021-07-09 NOTE — CONSULTS
7/9/2021    CD consult closing.  Pt is not appropriate for BRENNEN eval or discussion about services due to withdrawal and being on precedex.   Patients must be able to actively and appropriately participate in the evaluation process including: maintaining cognitive focus & recall over hour-long interview, providing responses that are coherent and accurate, and managing emotional upset.    A consult can be re-ordered when pt is appropriate for conversation and reports he wants residential treatment.     CECELIA Vasquez  [email or web address]  [phone]

## 2021-07-09 NOTE — PROGRESS NOTES
SPIRITUAL HEALTH SERVICES  SPIRITUAL ASSESSMENT Progress Note  [facility] ([facility]) UR 10 ICU     REFERRAL SOURCE: Unit     I visited the pt in his room. Pt greeted me in islamic greetings. Pt mentioned he feels cold and mentioned that he feels like his hands are tide in plastic rope. Pt mentioned he lives with his mother and siblings. I prayed for him may Allah vincenzo him complete recovery and complete healing of the soul and the body.    PLAN: SHS will remain the same.    Mamie James  Chaplain Resident  Phone: [phone]

## 2021-07-09 NOTE — PHARMACY-ADMISSION MEDICATION HISTORY
Please see Admission Medication History note completed by pharmacist on 7/8/2021 under previous encounter at [facility] ED for information regarding prior to admission medications. Verified Buspar dose as 15 mg tid with [facility]  #[phone]     Lissa Clarke, JamarcusD, BCPS

## 2021-07-09 NOTE — H&P
[facility] ICU ADMISSION NOTE  7/9/2021    PRIMARY TEAM: Hospitalist/ICU  PRIMARY PHYSICIAN: Dr. Calderon    REASON FOR CRITICAL CARE ADMISSION: Agitated delirium with AMS, drug use, and paranoid behaviors   ADMITTING PROVIDER: Angelica Villa CNP    ASSESSMENT: Charles Chapa is a 31 y.o. M with a PMH significant for polysubstance abuse and previous accidental overdoses who was admitted to psychiatry 7/8/21 in the setting of AMS, drug use, and paranoid behaviors. He was transferred to ICU early in the AM 7/9/21 for ongoing psychosis requiring IV medication management.    PLAN:   Neuro/ pain/ sedation:  #Agitated delirium - AMS with paranoid psychosis likely 2/2 intoxicating substances   #Polysubstance abuse   #Concern for possible schizoaffective disorder (possible family hx per pt's sister)    72 hour hold active (initiated 7/8)    Assault & seizure precautions    1:1 with psych associate at bedside     4-point soft restraints, remove once pt appropriate     Work-up:    Chemistries unremarkable    Tylenol and salicylate levels negative    UDS + amphetamines, cannabinoids, and benzodiazepines     Check EKG for QTc monitoring when pt allows     Non-con head CT when pt can lay still, ordered     Check TSH with reflex, repeat chemistries, CK level this AM    Treatment:    Haloperidol 5 mg IM on ICU arrival    Ativan 2 mg IM, continue 2mg IV q4h PRN once IV in place     Initiate precedex gtt once IV initiated     No history of ETOH use, will start CIWA scoring without medications and obtain additional info from sister this AM     Consults:    Psychiatry consult, appreciate recs    Social work, appreciate recs      Chemical dependency consult, appreciate recs     Home meds:     Resume alprazolam TID (@ 1/2 home dose) to prevent withdrawal, PDMP shows he fills this on a monthly basis    Holding home buspirone until we can confirm his dose      Pulmonary care:   #Nicotine dependence      Supplemental oxygen to keep  saturation above 92 %.    Continuous pulse oximetry monitor     Nicotine patch daily     Cardiovascular:    No active issues     Continuous cardiac monitor prior to initiation of precedex gtt    VS q1h while on precedex gtt     NS @ 100 ml/hr until taking p.o.     EKG for QTc monitoring once pt allows       GI care:   #GERD, possible hx of H pylori     Bedside swallow evaluation prior to po intake     Jainism diet, no pork    Continue PTA omeprazole      Renal/ Fluid Balance:    No active issues     Recheck electrolyte panel this AM    Check CK    ICU electrolyte replacement protocols      Endocrine:    No active issues     TSH with reflex ordered, will collect pending pt cooperation if unable to be added on    , no indication for insulin at this time      ID/ Antibiotics:  #Possible dental abscess   Seen in the ED at [facility] 7/4 for jaw pain, he had a cracked tooth and was given a 10-day course of amoxcillin and instructed to see a dentist ASAP.    WBC 6.3, afebrile thus far    No cultures    Continue amoxicillin 7/4 - 7/14      Heme:     No acute issues    SCDs for now, pt ambulatory    No heparin or lovenox d/t Mandaen restrictions      Prophylaxis:    - Mechanical prophylaxis for DVT.   - Continue PTA omeprazole      Lines/ tubes/ drains:  - PIV x1 to be placed, will defer additional vascular access for now      Disposition:    [facility] ICU while on precedex gtt, anticipate initiating oral agitation regimen today with psychiatry assistance with transfer to medicine once appropriate       Angelica Villa CNP  Critical Care time 60 minutes     - - - - - - - - - - - - - - - - - - - - - - - - - - - - - - - - - - - - - - - - - - - - - - - - - - - - - - - - - - - - - - - - - - - - - - - -     HISTORY PRESENTING ILLNESS:           Charles Chapa is a 31 y.o. M with a PMH significant for polysubstance abuse, recent opioid overdoses requiring narcan, possible dental abscess, and remote possible hx of H pylori. He  presented to the ED 7/8/21 with AMS. He was unable to give a reliable hx given his agitation. His sister arrived later and stated he lives in his mother's house, but she has been in [place] recently and he has been alone. Over the preceding few days he has been not sleeping and believes people are trying to kill him. He was noted to be walking around the house with a knife and has been displaying agitated and paranoid behavior. Per his sister, there is a family history of schizoaffective disorder and neither his sister or his friend has ever seen him like this before.          In the ED he was uncooperative, agitated, tangential and paranoid. He initially refused VS and labs. VS WNL once pt allowed staff to check them. He eventually allowed staff to draw labs. His CBC and chemistries were unremarkable, and tylenol and ASA levels were undetectable. He was transferred to the psychiatric floor where he continued to display agitated and aggressive behavior. Olanzapine 10 mg x1 and alprazolam 2 mg po were administered without improvement in his condition. The decision was made to transfer him to ICU for close monitoring and agitation management.         On ICU arrival, he was accompanied by ~ 10 staff members, strapped face-down on a gurney yelling and thrashing. He was transferred to the hospital bed and placed in 4 point soft restraints. IM haloperidol was administered as he remained agitated with ~ 6 staff members holding him down. He was not coherent enough to give any additional history. He remained agitated and ativan 2 mg IM was given. Plan for this morning is to obtain IV access, initiate a precedex gtt, continue his home alprazolam (He fills this script monthly), obtain repeat chemistries, CK, and TSH, and obtain head CT once agitation improves. Psychiatry and social work have been consulted and will see today.     REVIEW OF SYSTEMS: Unable to obtain ROS given agitation     PAST MEDICAL HISTORY:   Past Medical  History:   Diagnosis Date     Substance abuse (H)        SURGICAL HISTORY:   No past surgical history on file.    SOCIAL HISTORY:   Social History     Socioeconomic History     Marital status: Single     Spouse name: Not on file     Number of children: Not on file     Years of education: Not on file     Highest education level: Not on file   Occupational History     Not on file   Social Needs     Financial resource strain: Not on file     Food insecurity     Worry: Not on file     Inability: Not on file     Transportation needs     Medical: Not on file     Non-medical: Not on file   Tobacco Use     Smoking status: Former Smoker     Quit date: 5/1/2018     Years since quitting: 3.1     Smokeless tobacco: Never Used   Substance and Sexual Activity     Alcohol use: No     Drug use: Yes, UDS + amphetamines, cannabinoids, and benzos (prescribed alprazolam outpatient)     Sexual activity: Not Currently     Partners: Female   Lifestyle     Physical activity     Days per week: Not on file     Minutes per session: Not on file     Stress: Not on file   Relationships     Social connections     Talks on phone: Not on file     Gets together: Not on file     Attends [employer] service: Not on file     Active member of club or organization: Not on file     Attends meetings of clubs or organizations: Not on file     Relationship status: Not on file     Intimate partner violence     Fear of current or ex partner: Not on file     Emotionally abused: Not on file     Physically abused: Not on file     Forced sexual activity: Not on file   Other Topics Concern     Parent/sibling w/ CABG, MI or angioplasty before 65F 55M? No   Social History Narrative     Not on file       FAMILY HISTORY: MIRIAM family hx d/t agitation    ALLERGIES:    No Known Allergies    MEDICATIONS:  [COMPLETED] OLANZapine zydis (zyPREXA) ODT tab 10 mg    ALPRAZolam (XANAX) 2 MG tablet, Take 1 tablet by mouth 3 times daily  amoxicillin (AMOXIL) 875 MG tablet, Take 875 mg  by mouth 2 times daily for 10 days  busPIRone (BUSPAR) 15 MG tablet, Take 3 tablets by mouth daily   HYDROcodone-acetaminophen (NORCO) 5-325 MG tablet, Take 1 tablet by mouth every 6 hours as needed for pain  omeprazole (PRILOSEC) 20 MG DR capsule, TAKE 1 CAPSULE(20 MG) BY MOUTH DAILY        PHYSICAL EXAMINATION:  Temp:  [98.3  F (36.8  C)-98.4  F (36.9  C)] 98.3  F (36.8  C)  Pulse:  [95-98] 98  Resp:  [16] 16  BP: (110-125)/(80-82) 110/80  SpO2:  [100 %] 100 %    General: acutely agitated young man laying in bed, intermittently thrashing alternating with sleeping, words at times garbled and incomprehensible    HEENT: normocephalic/atraumatic, unable to assess pupils d/t agitation  Neuro: Agitated, garbled speech, moving all extremities spontaneously, will follow commands when calm. Unable to assess for laterality or perform pupillary exam as pt is uncooperative  CV: S1S2  Pulm: RA, lungs CTA  Abd: thin, scaphoid abdomen, BS+, no guarding pt too agitated to endorse pain  Extremities: Warm, well perfused   Skin: No obvious rashes, lesions, or wounds       LABS: Reviewed.   Arterial Blood Gases   No lab results found in last 7 days.  Complete Blood Count   Recent Labs   Lab 07/08/21  1406   WBC 6.3   HGB 13.7        Basic Metabolic Panel  Recent Labs   Lab 07/08/21  1406      POTASSIUM 3.8   CHLORIDE 108   CO2 29   BUN 17   CR 0.87   *     Liver Function Tests  Recent Labs   Lab 07/08/21  1406   AST 25   ALT 27   ALKPHOS 100   BILITOTAL 0.6   ALBUMIN 3.7     Pancreatic Enzymes  No lab results found in last 7 days.  Coagulation Profile  No lab results found in last 7 days.    IMAGING:  No results found for this or any previous visit (from the past 24 hour(s)).

## 2021-07-09 NOTE — PROGRESS NOTES
SPIRITUAL HEALTH SERVICES  SPIRITUAL ASSESSMENT Progress Note  [facility] ([facility]) ICU  7/9     REFERRAL SOURCE: Initial Visit    Pt's was sedated. The beside nurse mentioned he was not responding to conversations due to medications.    PLAN: Pt was referred to [facility]  who will follow up with pt later this afternoon.    America Gonzalez  Associate    Pager: [phone]

## 2021-07-10 ENCOUNTER — TELEPHONE (OUTPATIENT)
Dept: BEHAVIORAL HEALTH | Facility: CLINIC | Age: 31
End: 2021-07-10

## 2021-07-10 LAB
ETHOSUXIMIDE SERPL-MCNC: <10 UG/ML (ref 40–100)
GLUCOSE BLDC GLUCOMTR-MCNC: 104 MG/DL (ref 70–99)
GLUCOSE BLDC GLUCOMTR-MCNC: 105 MG/DL (ref 70–99)
GLUCOSE BLDC GLUCOMTR-MCNC: 114 MG/DL (ref 70–99)
GLUCOSE BLDC GLUCOMTR-MCNC: 119 MG/DL (ref 70–99)
GLUCOSE BLDC GLUCOMTR-MCNC: 122 MG/DL (ref 70–99)

## 2021-07-10 PROCEDURE — 999N001017 HC STATISTIC GLUCOSE BY METER IP

## 2021-07-10 PROCEDURE — 258N000003 HC RX IP 258 OP 636: Performed by: NURSE PRACTITIONER

## 2021-07-10 PROCEDURE — 99232 SBSQ HOSP IP/OBS MODERATE 35: CPT | Performed by: PSYCHIATRY & NEUROLOGY

## 2021-07-10 PROCEDURE — 99291 CRITICAL CARE FIRST HOUR: CPT | Performed by: NURSE PRACTITIONER

## 2021-07-10 PROCEDURE — 250N000013 HC RX MED GY IP 250 OP 250 PS 637: Performed by: NURSE PRACTITIONER

## 2021-07-10 PROCEDURE — 250N000011 HC RX IP 250 OP 636: Performed by: NURSE PRACTITIONER

## 2021-07-10 PROCEDURE — 120N000002 HC R&B MED SURG/OB UMMC

## 2021-07-10 RX ORDER — SODIUM CHLORIDE, SODIUM LACTATE, POTASSIUM CHLORIDE, CALCIUM CHLORIDE 600; 310; 30; 20 MG/100ML; MG/100ML; MG/100ML; MG/100ML
INJECTION, SOLUTION INTRAVENOUS CONTINUOUS
Status: DISCONTINUED | OUTPATIENT
Start: 2021-07-10 | End: 2021-07-11

## 2021-07-10 RX ADMIN — AMOXICILLIN 875 MG: 875 TABLET, FILM COATED ORAL at 21:11

## 2021-07-10 RX ADMIN — OMEPRAZOLE 20 MG: 20 CAPSULE, DELAYED RELEASE ORAL at 08:26

## 2021-07-10 RX ADMIN — SODIUM CHLORIDE, POTASSIUM CHLORIDE, SODIUM LACTATE AND CALCIUM CHLORIDE: 600; 310; 30; 20 INJECTION, SOLUTION INTRAVENOUS at 21:39

## 2021-07-10 RX ADMIN — AMOXICILLIN 875 MG: 875 TABLET, FILM COATED ORAL at 08:27

## 2021-07-10 RX ADMIN — ONDANSETRON 4 MG: 2 INJECTION INTRAMUSCULAR; INTRAVENOUS at 02:48

## 2021-07-10 RX ADMIN — LORAZEPAM 2 MG: 2 INJECTION INTRAMUSCULAR; INTRAVENOUS at 02:48

## 2021-07-10 RX ADMIN — SODIUM CHLORIDE, POTASSIUM CHLORIDE, SODIUM LACTATE AND CALCIUM CHLORIDE: 600; 310; 30; 20 INJECTION, SOLUTION INTRAVENOUS at 15:04

## 2021-07-10 RX ADMIN — OLANZAPINE 5 MG: 5 TABLET, ORALLY DISINTEGRATING ORAL at 21:11

## 2021-07-10 RX ADMIN — ALPRAZOLAM 1 MG: 0.5 TABLET ORAL at 18:33

## 2021-07-10 RX ADMIN — OLANZAPINE 5 MG: 5 TABLET, ORALLY DISINTEGRATING ORAL at 08:27

## 2021-07-10 RX ADMIN — ALPRAZOLAM 1 MG: 0.5 TABLET ORAL at 08:27

## 2021-07-10 NOTE — TELEPHONE ENCOUNTER
S: PRASANNA Elaine in the ICU [facility] called at 4:17PM requesting for mh inpt psych.     B: Pt came in altered mental status, psychosis, paranoia and agitation.  Pt has been medically cleared for mh inpt placement.  Pt had a psych consult done yesterday.  Pt ambulates independently.  He is on a 72HH.      A: 72HH.     R: Identifying placement options.        Patient cleared and ready for behavioral bed placement: Yes

## 2021-07-10 NOTE — PROGRESS NOTES
ICU PROGRESS NOTE  07/10/2021        Date of Service: 07/10/2021    ASSESSMENT:  Charles Chapa is a 31 year old male who was admitted on 7/9/2021 with AMS, polysubstance abuse, paranoia and psychosis. He was transferred to the ICU for psychosis and need for intravenous medications.     He is more calm, but remains altered. Chem Dep unable to assess thus far, as he has been unable to participate in interview. Would anticipate stabilization of his psychosis in psych followed by admission to chem dep if patient seeking treatment. Awaiting inout from psych about dispo, reached out to psych intake.     CHANGES and MAJOR THINGS TODAY:   - Patient would be medically clear to admit to psych if psych team agreeable.   - Administer IVF  - Re check ECG when patient agreeable  - did not allow labs this am, send now that patient more agreeable.    PLAN:    PLAN:   Neuro/ pain/ sedation:  #Agitated delirium - AMS with paranoid psychosis likely 2/2 intoxicating substances   #Polysubstance abuse   #Concern for possible schizoaffective disorder (possible family hx per pt's sister)  ? 72 hour hold active (initiated 7/8, expires Wednesday 7/14)  ? Assault & seizure precautions  ? 1:1 with psych associate at bedside   ? Work-up:    Chemistries unremarkable    Tylenol and salicylate levels negative    UDS + amphetamines, cannabinoids, and benzodiazepines     Check EKG for QTc monitoring when pt allows     Check TSH with reflex, repeat chemistries, CK level this AM  ? Treatment:    Haloperidol 5 mg IV q 6 hours prn    Ativan 2 mg IV q 4 prn  ? Consults:    Psychiatry consult, appreciate recs    Social work, appreciate recs      Chemical dependency consult- re-consult when patient more cooperative.  ? Home meds:     Resume alprazolam TID (@ 1/2 home dose) to prevent withdrawal, PDMP shows he fills this on a monthly basis although unclear if he is diverting some of this    Holding home buspirone until we can confirm his dose       Pulmonary care:   #Nicotine dependence    ? Supplemental oxygen to keep saturation above 92 %, currently does not require supplemental oxygen  ? Continuous pulse oximetry monitor   ? Nicotine patch daily      Cardiovascular:    No active issues:  ? VS q1h while on precedex gtt   ? Initiate LR @ 100 ml  ? EKG for QTc monitoring once pt allows       GI care:   #GERD, possible hx of H pylori   ? Bedside swallow evaluation prior to po intake   ? Episcopalian diet, no pork  ? Continue PTA omeprazole      Renal/ Fluid Balance:    No active issues   ? Trend, CK. Re check tomorrow  ? ICU electrolyte replacement protocols       Endocrine:    No active issues   ? TSH with reflex ordered, will collect pending pt cooperation if unable to be added on  ? , no indication for insulin at this time       ID/ Antibiotics:  #Possible dental abscess   Seen in the ED at [facility] 7/4 for jaw pain, he had a cracked tooth and was given a 10-day course of amoxcillin and instructed to see a dentist ASAP.  ? WBC 6.3, afebrile thus far  ? No cultures  ? Continue amoxicillin 7/4 - 7/14       Heme:     No acute issues  ? SCDs for now, pt ambulatory  ? No heparin or lovenox d/t Sabianist restrictions       Prophylaxis:    - Mechanical prophylaxis for DVT.   - Continue PTA omeprazole       Lines/ tubes/ drains:  - PIV x1 to be placed, will defer additional vascular access for now       Disposition:  ? Psych if psych team in agreement. Would be appropriate for tx to the floor       General Cares/Prophylaxis:    DVT Prophylaxis: Pneumatic Compression Devices  GI Prophylaxis: PPI  Restraints: Restraints for medical healing needed: Not Applicable    Lines/ tubes/ drains:  - R PIV      Patient seen, findings and plan discussed with surgical ICU staff, Dr. Parmjit Lennon.    Time spent on this Encounter   Billing:  I spent 35 minutes bedside and on the inpatient unit today managing the critical care of Charles Chapa in relation to the issues listed in  "this note.    Argentina ELIZABETHTomas Pierre AG-ACNP  Critical Care  [facility]    ====================================  INTERVAL HISTORY:   Agitated overnight, calm and cooperative this evening. Allowing placement of PIV and nursing cares.    RADHA Reports his nausea went away after Zofran. Not feeling hungry. Feeling \"better\".    OBJECTIVE:   1. VITAL SIGNS:   Temp:  [98  F (36.7  C)-99.6  F (37.6  C)] 98.7  F (37.1  C)  Pulse:  [] 73  Resp:  [16-37] 16  BP: (111-141)/() 120/86  SpO2:  [94 %-100 %] 95 %  Resp: 16      2. INTAKE/ OUTPUT:   I/O last 3 completed shifts:  In: 180 [P.O.:180]  Out: 325 [Urine:275; Emesis/NG output:50]    3. PHYSICAL EXAMINATION:  General: Emaciated young male in no obvious distress, restless and agitated this morning, now calm  HEENT: Dry mucous membranes, poor dentition  Neuro: Answers all orientation questions appropriately.   Pulm/Resp: Clear breath sounds bilaterally without rhonchi, crackles or wheeze, breathing non-labored  CV: RRR  Abdomen: Soft, non-distended, non-tender. no rebound tenderness or guarding, no masses  : Voided 300 ml yellow urine  MSK/Extremities:  moving all extremities, peripheral pulses intact    4. INVESTIGATIONS:   Arterial Blood Gases   No lab results found in last 7 days.  Complete Blood Count   Recent Labs   Lab 07/08/21  1406   WBC 6.3   HGB 13.7        Basic Metabolic Panel  Recent Labs   Lab 07/09/21  0746 07/08/21  1406    138   POTASSIUM 3.9 3.8   CHLORIDE 112* 108   CO2 21 29   BUN 12 17   CR 0.73 0.87   * 102*     Liver Function Tests  Recent Labs   Lab 07/09/21  0746 07/08/21  1406   AST 33 25   ALT 27 27   ALKPHOS 89 100   BILITOTAL 0.7 0.6   ALBUMIN 3.4 3.7     Pancreatic Enzymes  No lab results found in last 7 days.  Coagulation Profile  No lab results found in last 7 days.      5. RADIOLOGY:   No results found for this or any previous visit (from the past 24 " hour(s)).    =========================================

## 2021-07-10 NOTE — PROGRESS NOTES
This writer met with patient at bedside to attempt to complete CMA. Patient sleeping and lethargic and did not wake to this writers voice. Bedside sitter indicated patient has been lethargic most of the morning.     1500: This writer checked in with patient for a second time. He acknowledged this writer's presence but declined to speak further. He denied any questions. Patient presented as lethargic and unable to participate in psychosocial assessment.       Tracey Samayoa, TOBI, LICSW

## 2021-07-10 NOTE — TELEPHONE ENCOUNTER
S: Dr. Adson called at 1644 to refer a pt for inpatient mental health treatment.    B: Pt was admitted to [facility] on 7/8/21 and transferred to [facility] ICU on 7/9/21. Pt is now medically stable and cleared to transfer back to inpatient psychiatry.    A: 72 hour hold. Please see consult note dated 7/10/21.    R: Identifying placement options. Placed on work list.  Patient cleared and ready for behavioral bed placement: Yes

## 2021-07-10 NOTE — PLAN OF CARE
ICU End of Shift Summary. See flowsheets for vital signs and detailed assessment.    Changes this shift: Pt A/O to self and date, disoriented to situation. RA, RFA PIV SL - dressing C/D/I. Pt pulled off tele leads, NP aware, pt now Q4H vitals. Pt has been nauseous off and on throughout the shift. Only able to keep down a few crackers. PRN Zofran, ativan, and haldol given for agitation, anxiety, and nausea and to help with possible withdrawal symptoms as well. Pt refused lab blood draw this morning. Pascual pulled at 0400. Pt also refused bathing, oral care, and was difficult to get vitals on as pt would not sit still or keep arm straight for BP's. Pt removed his pulse ox at 0630 and would not allow me to put it back on.     Plan: Continue to monitor and treat nausea, anxiety, and agitation. Possible transfer back to psych unit today.       Problem: Adult Inpatient Plan of Care  Goal: Plan of Care Review  Outcome: No Change  Goal: Patient-Specific Goal (Individualized)  Outcome: No Change  Goal: Absence of Hospital-Acquired Illness or Injury  Outcome: No Change  Intervention: Identify and Manage Fall Risk  Recent Flowsheet Documentation  Taken 7/9/2021 2000 by Padmini Patel, RN  Safety Promotion/Fall Prevention: sitter at bedside  Intervention: Prevent Skin Injury  Recent Flowsheet Documentation  Taken 7/10/2021 0000 by Padmini Patel, RN  Body Position: position changed independently  Taken 7/9/2021 2000 by Padmini Patel, RN  Body Position: position changed independently  Intervention: Prevent and Manage VTE (Venous Thromboembolism) Risk  Recent Flowsheet Documentation  Taken 7/10/2021 0000 by Padmini Patel, RN  VTE Prevention/Management: AROM (active range of motion) performed  Taken 7/9/2021 2000 by Padmini Patel, RN  VTE Prevention/Management: AROM (active range of motion) performed  Goal: Optimal Comfort and Wellbeing  Outcome: No Change  Goal: Readiness for Transition of Care  Outcome: No Change      Problem: Violence Risk or Actual  Goal: Anger and Impulse Control  Outcome: No Change     Problem: Restraint for Non-Violent/Non-Self-Destructive Behavior  Goal: Prevent/Manage Potential Problems  Description: Maintain safety of patient and others during period of restraint.  Promote psychological and physical wellbeing.  Prevent injury to skin and involved body parts.  Promote nutrition, hydration, and elimination.  Outcome: No Change

## 2021-07-11 LAB — CK SERPL-CCNC: 215 U/L (ref 30–300)

## 2021-07-11 PROCEDURE — 82550 ASSAY OF CK (CPK): CPT | Performed by: NURSE PRACTITIONER

## 2021-07-11 PROCEDURE — 258N000003 HC RX IP 258 OP 636: Performed by: NURSE PRACTITIONER

## 2021-07-11 PROCEDURE — 93010 ELECTROCARDIOGRAM REPORT: CPT | Performed by: INTERNAL MEDICINE

## 2021-07-11 PROCEDURE — 99232 SBSQ HOSP IP/OBS MODERATE 35: CPT | Performed by: HOSPITALIST

## 2021-07-11 PROCEDURE — 93005 ELECTROCARDIOGRAM TRACING: CPT

## 2021-07-11 PROCEDURE — 120N000002 HC R&B MED SURG/OB UMMC

## 2021-07-11 PROCEDURE — 36415 COLL VENOUS BLD VENIPUNCTURE: CPT | Performed by: NURSE PRACTITIONER

## 2021-07-11 PROCEDURE — 250N000013 HC RX MED GY IP 250 OP 250 PS 637: Performed by: NURSE PRACTITIONER

## 2021-07-11 RX ADMIN — ALPRAZOLAM 1 MG: 0.5 TABLET ORAL at 08:38

## 2021-07-11 RX ADMIN — OLANZAPINE 5 MG: 5 TABLET, ORALLY DISINTEGRATING ORAL at 08:37

## 2021-07-11 RX ADMIN — AMOXICILLIN 875 MG: 875 TABLET, FILM COATED ORAL at 08:38

## 2021-07-11 RX ADMIN — SODIUM CHLORIDE, POTASSIUM CHLORIDE, SODIUM LACTATE AND CALCIUM CHLORIDE: 600; 310; 30; 20 INJECTION, SOLUTION INTRAVENOUS at 08:35

## 2021-07-11 RX ADMIN — ALPRAZOLAM 1 MG: 0.5 TABLET ORAL at 16:30

## 2021-07-11 RX ADMIN — ALPRAZOLAM 1 MG: 0.5 TABLET ORAL at 00:18

## 2021-07-11 RX ADMIN — OMEPRAZOLE 20 MG: 20 CAPSULE, DELAYED RELEASE ORAL at 08:38

## 2021-07-11 NOTE — CONSULTS
Another consult was received. I checked with MH intake and they assure me that he is on the list for transfer to [facility], does not need to be seen again by psychiatry.   Consult order cleared.   Page me at [phone] as needed.

## 2021-07-11 NOTE — PLAN OF CARE
VS: /79 (BP Location: Right arm)   Pulse 95   Temp 98.4  F (36.9  C) (Axillary)   Resp 18   SpO2 99%   VSS, Q4   O2: >90% on RA   Output: Voids spontaneously, using bedside urinal, up to toilet x1   Last BM: 7/11, small   Activity: Pt is not safe to walk, attempted to go to bathroom and pt was stumbling and almost falling.   Skin: CDI, dry   Pain: denies   CMS: Disoriented to situation, denies N/T, pulses intact.   Dressing: none   Diet: Regular, not eating much, sister brought food last night, only at a few bites with sister assisting   LDA: R PIV infusing LR at 100 ml, CDI   Equipment: IV pole, seizure pads. 1:1 sitter   Plan: Continue POC   Additional Info: Pt denies CP, SOB, N/V , N/T/D. Pt denies pain, pt is restless in bed, complaining of being cold, multiple warm blankets given.

## 2021-07-11 NOTE — PLAN OF CARE
VS: /79 (BP Location: Right arm)   Pulse 61   Temp 98.7  F (37.1  C) (Axillary)   Resp 18   SpO2 100%     Neuro- Disoriented to situation.     CV - Patient refuses TELE. Heart sounds normal.     Pulm - LS clear.      O2: Room air    Output: Void in urinal    Last BM: Unknown    Activity: Bedrest    Up for meals? No   Skin: No issues.    Pain: Denies    CMS: Intact    Dressing: None    Diet: Mormonism diet - no pork    LDA: PIV right forearm infusing.    Equipment: None    Plan: Patient to med/surg TELE bed when able.    Additional Info: Patient refusing TELE, was able to get patient to agree to vs q 4 hours, and dressed in behavioral scrubs. Patient on 1:1 and 72 hour hold. Patient was able to void in urinal. Patient slept on and off most of the day.

## 2021-07-11 NOTE — PROGRESS NOTES
[facility], [place], [place]  Internal Medicine Progress Note      Assessment and Plans:     Charles Chapa is a 31 year old male who was admitted on 7/9/2021 with AMS, polysubstance abuse, paranoia and psychosis. He was transferred to the ICU for psychosis and need for intravenous medications.      He is more calm, but remains altered. Chem Dep unable to assess thus far, as he has been unable to participate in interview. Would anticipate stabilization of his psychosis in psych followed by admission to chem dep if patient seeking treatment. Awaiting inout from psych about dispo, reached out to psych intake.      CHANGES and MAJOR THINGS TODAY:       Pt needs to be seen by psychiatry for intake.   Pt wants to go home though  Stop IV and Tele  Ct current medications  Medically stable for discharge to psychiatry     PLAN:     PLAN:  Neuro/ pain/ sedation:    #Agitated delirium - AMS with paranoid psychosis likely 2/2 intoxicating substances   #Polysubstance abuse   #Concern for possible schizoaffective disorder (possible family hx per pt's sister)    ? 72 hour hold active (initiated 7/8, expires Wednesday 7/14)  ? Assault & seizure precautions  ? 1:1 with psych associate at bedside   ? Work-up:    Chemistries unremarkable    Tylenol and salicylate levels negative    UDS + amphetamines, cannabinoids, and benzodiazepines     Check EKG for QTc monitoring when pt allows     Check TSH with reflex, repeat chemistries, CK level this AM  ? Treatment:    Haloperidol 5 mg IV q 6 hours prn    Ativan 2 mg IV q 4 prn  ? Consults:    Psychiatry consult, appreciate recs    Social work, appreciate recs      Chemical dependency consult- re-consult when patient more cooperative.  ? Home meds:     Resume alprazolam TID (@ 1/2 home dose) to prevent withdrawal, PDMP shows he fills this on a monthly basis although unclear if he is diverting some of this    Holding home  buspirone until we can confirm his dose    Pulmonary care:   #Nicotine dependence    ? Supplemental oxygen to keep saturation above 92 %, currently does not require supplemental oxygen  ? Continuous pulse oximetry monitor   ? Nicotine patch daily     Cardiovascular:    No active issues:  ? VS q1h while on precedex gtt   ? Initiate LR @ 100 ml  ? EKG for QTc monitoring once pt allows       GI care:   #GERD, possible hx of H pylori   ? Bedside swallow evaluation prior to po intake   ? Orthodoxy diet, no pork  ? Continue PTA omeprazole      Renal/ Fluid Balance:    No active issues   ? Trend, CK. Re check tomorrow  ? ICU electrolyte replacement protocols       Endocrine:    No active issues   ? TSH with reflex ordered, will collect pending pt cooperation if unable to be added on  ? , no indication for insulin at this time       ID/ Antibiotics:  #Possible dental abscess   [facility] for [facility], he had a cracked tooth and was given a 10-day course of amoxcillin and instructed to see a dentist ASAP.  ? WBC 6.3, afebrile thus far  ? No cultures  ? Continue amoxicillin 7/4 - 7/14       Heme:     No acute issues  ? SCDs for now, pt ambulatory  ? No heparin or lovenox d/t Jewish restrictions       Prophylaxis:    - Mechanical prophylaxis for DVT.   - Continue PTA omeprazole       Lines/ tubes/ drains:  - PIV x1 to be placed, will defer additional vascular access for now       Disposition:  ? Psych if psych team in agreement. Would be appropriate for tx to the floor        General Cares/Prophylaxis:    DVT Prophylaxis: Pneumatic Compression Devices  GI Prophylaxis: PPI  Restraints: Restraints for medical healing needed: Not Applicable     Lines/ tubes/ drains:  - R PIV    Mejia Coley MD, MPH.  Internal Medicine Attending  [place], [place]    Click on the link below to page me.   Text Page  (7am - 5pm, M-F)    Subjective:          -Data reviewed today: I reviewed  all new labs and imaging results over the last 24 hours.     Exam:       Temp: 98.9  F (37.2  C) Temp src: Oral BP: (!) 130/90 Pulse: 63   Resp: 17 SpO2: 100 % O2 Device: None (Room air)    There were no vitals filed for this visit.  Vital Signs with Ranges  Temp:  [98.4  F (36.9  C)-99.4  F (37.4  C)] 98.9  F (37.2  C)  Pulse:  [61-95] 63  Resp:  [16-18] 17  BP: (127-135)/(79-95) 130/90  SpO2:  [99 %-100 %] 100 %  I/O last 3 completed shifts:  In: 270 [P.O.:270]  Out: 975 [Urine:975]    Constitutional: No distress noted, Alert, Answering questions appropriately  Respiratory: AE is good on both sides, no wheezing onr crackles  Cardiovascular: S1S2 normal, no new murmur  GI: Soft, non tender  Skin/Integumen: No rash      Medications     lactated ringers 100 mL/hr at 07/11/21 0835       ALPRAZolam  1 mg Oral Q8H     amoxicillin  875 mg Oral BID     [Held by provider] busPIRone  15 mg Oral TID     OLANZapine zydis  5 mg Oral BID     omeprazole  20 mg Oral Daily       Data   Recent Labs   Lab 07/09/21  0746 07/08/21  1406   WBC  --  6.3   HGB  --  13.7   MCV  --  95   PLT  --  369    138   POTASSIUM 3.9 3.8   CHLORIDE 112* 108   CO2 21 29   BUN 12 17   CR 0.73 0.87   ANIONGAP 8 1*   MEGAN 8.7 8.9   * 102*   ALBUMIN 3.4 3.7   PROTTOTAL 6.8 7.4   BILITOTAL 0.7 0.6   ALKPHOS 89 100   ALT 27 27   AST 33 25       No results found for this or any previous visit (from the past 24 hour(s)).

## 2021-07-11 NOTE — PLAN OF CARE
Pt arrive to unit at 2130, sleeping.    Vitals /79 (BP Location: Right arm)   Pulse 61   Temp 98.7  F (37.1  C) (Axillary)   Resp 18   SpO2 100%   VSS on RA   A+O Lethargic, disoriented to situation    Output Voiding via urinal  LBM: PTA   Respiratory WDL   Cardiac WDL  On tele, may remove per report but has not since transfer   Activity Not OOB   Skin intact   Pain denies   Neuro/CSM intact   LDA Right PIV, @100ml/hr   Equipment IV pump, pole   Plan Continue plan of care   Additional Was able to place on tele while sleeping  Currently on a 72 hr hold to end 7/14

## 2021-07-11 NOTE — PLAN OF CARE
VS: VSS   O2: Stable on RA   Output: Sits at edge of bed to void in urinal.   Last BM: Today   Activity: In bed. Turns ad marko. Up with SBA to BR this afternoon.    Skin: Dry intact.   Pain: Denies.   CMS: Denies numbness or tingling. Oriented to  Self, date and location. Not sure about situation.   Dressing: None.   Diet: Regular, no pork. Ate no breakfast. Ate mashed potatoes and a cup of tea with sugar at lunch. Needs assist with eating.   LDA: PIV SL   Equipment: Seizure pads. Telemetry, removed at 1450   Plan: Psych to evaluate.   Additional Info: 1:1 sitter for safety. Does not call for assist to get up.

## 2021-07-11 NOTE — PROGRESS NOTES
Transferred to:  room 807 at 2130  Status at time of transfer: Pt A/O x 3, disoriented to situation. Pt transferred with seizure pads on bed. Pt sitter accompanied pt during transfer and remained with pt in new room. RFA PIV SL for transfer. Report given to JOSE Morrow.   Belongings: Pt belongings transferred with patient.   Mchugh removed? (if no, why?): no mchugh  Chart and medications: 2000 medications administered prior to transfer.  Family notified: Sister Lucille notified of patient transfer to . Pt provided room number and phone number for unit.

## 2021-07-12 ENCOUNTER — HOSPITAL ENCOUNTER (INPATIENT)
Facility: CLINIC | Age: 31
LOS: 1 days | Discharge: HOME | End: 2021-07-13
Attending: PSYCHIATRY & NEUROLOGY | Admitting: PSYCHIATRY & NEUROLOGY
Payer: COMMERCIAL

## 2021-07-12 ENCOUNTER — TELEPHONE (OUTPATIENT)
Dept: BEHAVIORAL HEALTH | Facility: CLINIC | Age: 31
End: 2021-07-12

## 2021-07-12 VITALS
TEMPERATURE: 99.5 F | RESPIRATION RATE: 18 BRPM | HEART RATE: 79 BPM | DIASTOLIC BLOOD PRESSURE: 76 MMHG | SYSTOLIC BLOOD PRESSURE: 124 MMHG | OXYGEN SATURATION: 96 %

## 2021-07-12 DIAGNOSIS — F41.9 ANXIETY: ICD-10-CM

## 2021-07-12 DIAGNOSIS — K04.7 DENTAL ABSCESS: Primary | ICD-10-CM

## 2021-07-12 LAB
INTERPRETATION ECG - MUSE: NORMAL
INTERPRETATION ECG - MUSE: NORMAL

## 2021-07-12 PROCEDURE — 99207 PR CDG-MDM COMPONENT: MEETS MODERATE - UP CODED: CPT | Performed by: HOSPITALIST

## 2021-07-12 PROCEDURE — 250N000013 HC RX MED GY IP 250 OP 250 PS 637: Performed by: NURSE PRACTITIONER

## 2021-07-12 PROCEDURE — 99207 PR CDG-MDM COMPONENT: MEETS MODERATE - UP CODED: CPT | Performed by: NURSE PRACTITIONER

## 2021-07-12 PROCEDURE — 250N000013 HC RX MED GY IP 250 OP 250 PS 637: Performed by: PSYCHIATRY & NEUROLOGY

## 2021-07-12 PROCEDURE — 99232 SBSQ HOSP IP/OBS MODERATE 35: CPT | Performed by: HOSPITALIST

## 2021-07-12 PROCEDURE — 99239 HOSP IP/OBS DSCHRG MGMT >30: CPT | Performed by: HOSPITALIST

## 2021-07-12 PROCEDURE — 124N000002 HC R&B MH UMMC

## 2021-07-12 PROCEDURE — 99232 SBSQ HOSP IP/OBS MODERATE 35: CPT | Performed by: NURSE PRACTITIONER

## 2021-07-12 RX ORDER — AMOXICILLIN 250 MG
1 CAPSULE ORAL 2 TIMES DAILY PRN
Status: DISCONTINUED | OUTPATIENT
Start: 2021-07-12 | End: 2021-07-13 | Stop reason: HOSPADM

## 2021-07-12 RX ORDER — HYDROXYZINE HYDROCHLORIDE 25 MG/1
25 TABLET, FILM COATED ORAL EVERY 4 HOURS PRN
Status: DISCONTINUED | OUTPATIENT
Start: 2021-07-12 | End: 2021-07-13 | Stop reason: HOSPADM

## 2021-07-12 RX ORDER — OLANZAPINE 10 MG/1
10 TABLET, ORALLY DISINTEGRATING ORAL
Status: ON HOLD | DISCHARGE
Start: 2021-07-12 | End: 2021-07-13

## 2021-07-12 RX ORDER — OLANZAPINE 5 MG/1
5 TABLET, ORALLY DISINTEGRATING ORAL 2 TIMES DAILY
Status: ON HOLD | DISCHARGE
Start: 2021-07-12 | End: 2021-07-13

## 2021-07-12 RX ORDER — OLANZAPINE 5 MG/1
5 TABLET, ORALLY DISINTEGRATING ORAL 2 TIMES DAILY
Status: DISCONTINUED | OUTPATIENT
Start: 2021-07-12 | End: 2021-07-13 | Stop reason: HOSPADM

## 2021-07-12 RX ORDER — TRAZODONE HYDROCHLORIDE 50 MG/1
50 TABLET, FILM COATED ORAL
Status: DISCONTINUED | OUTPATIENT
Start: 2021-07-12 | End: 2021-07-13 | Stop reason: HOSPADM

## 2021-07-12 RX ORDER — ALPRAZOLAM 0.25 MG
1 TABLET ORAL 3 TIMES DAILY
Status: DISCONTINUED | OUTPATIENT
Start: 2021-07-13 | End: 2021-07-13 | Stop reason: HOSPADM

## 2021-07-12 RX ORDER — ACETAMINOPHEN 325 MG/1
650 TABLET ORAL EVERY 4 HOURS PRN
Status: ON HOLD | DISCHARGE
Start: 2021-07-12 | End: 2021-07-13

## 2021-07-12 RX ORDER — LORAZEPAM 2 MG/ML
2 INJECTION INTRAMUSCULAR EVERY 4 HOURS PRN
Status: ON HOLD | DISCHARGE
Start: 2021-07-12 | End: 2021-07-13

## 2021-07-12 RX ORDER — OLANZAPINE 10 MG/2ML
10 INJECTION, POWDER, FOR SOLUTION INTRAMUSCULAR 3 TIMES DAILY PRN
Status: DISCONTINUED | OUTPATIENT
Start: 2021-07-12 | End: 2021-07-13 | Stop reason: HOSPADM

## 2021-07-12 RX ORDER — ACETAMINOPHEN 325 MG/1
650 TABLET ORAL EVERY 4 HOURS PRN
Status: DISCONTINUED | OUTPATIENT
Start: 2021-07-12 | End: 2021-07-13 | Stop reason: HOSPADM

## 2021-07-12 RX ORDER — MAGNESIUM HYDROXIDE/ALUMINUM HYDROXICE/SIMETHICONE 120; 1200; 1200 MG/30ML; MG/30ML; MG/30ML
30 SUSPENSION ORAL EVERY 4 HOURS PRN
Status: DISCONTINUED | OUTPATIENT
Start: 2021-07-12 | End: 2021-07-13 | Stop reason: HOSPADM

## 2021-07-12 RX ORDER — AMOXICILLIN 875 MG
875 TABLET ORAL 2 TIMES DAILY
Status: DISCONTINUED | OUTPATIENT
Start: 2021-07-12 | End: 2021-07-13 | Stop reason: HOSPADM

## 2021-07-12 RX ORDER — HALOPERIDOL 5 MG/ML
5 INJECTION INTRAMUSCULAR EVERY 6 HOURS PRN
Status: ON HOLD | DISCHARGE
Start: 2021-07-12 | End: 2021-07-13

## 2021-07-12 RX ORDER — OLANZAPINE 10 MG/1
10 TABLET ORAL 3 TIMES DAILY PRN
Status: DISCONTINUED | OUTPATIENT
Start: 2021-07-12 | End: 2021-07-13 | Stop reason: HOSPADM

## 2021-07-12 RX ADMIN — AMOXICILLIN 875 MG: 875 TABLET, FILM COATED ORAL at 08:07

## 2021-07-12 RX ADMIN — ALPRAZOLAM 1 MG: 0.5 TABLET ORAL at 00:22

## 2021-07-12 RX ADMIN — ALPRAZOLAM 1 MG: 0.5 TABLET ORAL at 08:07

## 2021-07-12 RX ADMIN — ALPRAZOLAM 1 MG: 0.5 TABLET ORAL at 17:05

## 2021-07-12 RX ADMIN — OLANZAPINE 5 MG: 5 TABLET, ORALLY DISINTEGRATING ORAL at 22:58

## 2021-07-12 RX ADMIN — OLANZAPINE 5 MG: 5 TABLET, ORALLY DISINTEGRATING ORAL at 08:07

## 2021-07-12 RX ADMIN — OMEPRAZOLE 20 MG: 20 CAPSULE, DELAYED RELEASE ORAL at 08:07

## 2021-07-12 ASSESSMENT — MIFFLIN-ST. JEOR: SCORE: 1545.95

## 2021-07-12 NOTE — TELEPHONE ENCOUNTER
R: paged [place] at 9:03 am. vani  Per [place], she will do the provider review and will then call back to intake. vani  #32/byron accepted for herself             Per dr alphonso matthews dr handoff was completed              #32 informed at 1:38 pm, 8A informed at 1:46 pm          vani
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
## 2021-07-12 NOTE — PLAN OF CARE
VS: /76 (BP Location: Right arm)   Pulse 79   Temp 99.5  F (37.5  C) (Oral)   Resp 18   SpO2 96%    VSS - temp 99.5 tylenol offered and patient refused   Output: Voiding spontaneously without difficulty  LBM 7/12   Lungs: Refused auscultation    Activity: SBA when up   Repositioning self in bed - encouraged position changes   Skin: Refused under clothing assessment - visible skin intact WDL   Pain:   Denies   Neuro/CMS:   A&Ox4   Dressing(s):   None   Diet:   Regular - breakfast ordered by writer - encouraged multiple times before patient consumed something. Ate breakfast and lunch.   LDA:   PIV SL   Equipment:   Seizure pads   Plan:   72 hour hold expires 7/14, continued 1:1 bedside attendant for safety, seizure precautions maintained, assault precautions maintained - no signs of aggression this shift, plan to transfer to  psych today    Additional Info:   STIVEN 2    Patient wishes to go home because he states he has court tomorrow.    Patients sister called and would like social work to see patient regarding potential drug rehab - patient has been in the past    Patient continually requesting to see psych this shift - Writer contacted psychiatry who came up to floor to see patient
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
## 2021-07-12 NOTE — PLAN OF CARE
Pt mildly confused about situation. CIWA score low, scheduled xanax given as ordered. No pain. Did allow vital signs from the 1;1 sitter. No SOB or CP. Tolerated some apple juice. Calm and cooperative tonight. Cont to assess.

## 2021-07-12 NOTE — PLAN OF CARE
VS: BP (!) 132/91 (BP Location: Right arm)   Pulse 67   Temp 98.4  F (36.9  C) (Oral)   Resp 18   SpO2 100%    Denies chest pain and SOB  Alert and oriented. Withdrawn throughout the shift. Refused auscultation and assessment  Refused second set of vitals   O2: >90% on RA   Output: Voids via urinal   Last BM: 7/11/21  Bowel sounds active   Activity: Up ad marko   Skin: Visible skin intact. Pt refused full skin assessment   Pain: Denies   CMS: Intact. Denied numbness and tingling   Dressing: N/A   Diet: Regular   LDA: PIV R arm   Equipment: Personal belongings, sitter at bedside   Plan: Continue to monitor   Additional Info: 72 hour hold  Pt refused bedtime dose of amoxicillin and zyprexa. Offered a second time, pt still refused. MD aware.

## 2021-07-13 VITALS
RESPIRATION RATE: 16 BRPM | HEART RATE: 106 BPM | SYSTOLIC BLOOD PRESSURE: 94 MMHG | BODY MASS INDEX: 13.71 KG/M2 | DIASTOLIC BLOOD PRESSURE: 68 MMHG | WEIGHT: 110.3 LBS | OXYGEN SATURATION: 100 % | TEMPERATURE: 98.7 F | HEIGHT: 75 IN

## 2021-07-13 PROCEDURE — 250N000013 HC RX MED GY IP 250 OP 250 PS 637: Performed by: PSYCHIATRY & NEUROLOGY

## 2021-07-13 PROCEDURE — 99236 HOSP IP/OBS SAME DATE HI 85: CPT | Performed by: NURSE PRACTITIONER

## 2021-07-13 RX ORDER — BUSPIRONE HYDROCHLORIDE 15 MG/1
15 TABLET ORAL 3 TIMES DAILY
Status: DISCONTINUED | OUTPATIENT
Start: 2021-07-13 | End: 2021-07-13 | Stop reason: HOSPADM

## 2021-07-13 RX ORDER — BUSPIRONE HYDROCHLORIDE 15 MG/1
15 TABLET ORAL 3 TIMES DAILY
Start: 2021-07-13 | End: 2023-09-05

## 2021-07-13 RX ORDER — AMOXICILLIN 875 MG
875 TABLET ORAL 2 TIMES DAILY
Qty: 8 TABLET | Refills: 0 | Status: SHIPPED | OUTPATIENT
Start: 2021-07-13 | End: 2023-09-05

## 2021-07-13 RX ADMIN — ALPRAZOLAM 1 MG: 0.25 TABLET ORAL at 08:19

## 2021-07-13 ASSESSMENT — ACTIVITIES OF DAILY LIVING (ADL)
DRESS: INDEPENDENT
LAUNDRY: WITH SUPERVISION
HYGIENE/GROOMING: INDEPENDENT
ORAL_HYGIENE: INDEPENDENT

## 2021-07-13 NOTE — DISCHARGE INSTRUCTIONS
"Behavioral Discharge Planning and Instructions    Summary: You were admitted on 7/12/2021  due to admitted on a 72-hour hold from [address] [place] [address] where he had been hospitalized since 7/9/2021 due to agitated delirium.  You were treated by Argentina Mitchell CNP and discharged on 7/13/21 from [facility]    Main Diagnosis: Methamphetamine-induced psychosis, resolved  Unspecified anxiety disorder  Benzodiazepine use disorder  Amphetamine use disorder  Cannabis use disorder  Opiate use disorder  Nicotine use disorder  Dental abscess, GERD    Health Care Follow-up:     Psychiatry - Dr. Carolina Mcguire - [facility]- you indicate you will schedule your own appointment.    The Treatment team is recommending a chemical health assessment.  One option for an assessment is:  At [facility]  Call [phone] to schedule an evaluation.  \"The first step in entering treatment is typically to have a substance abuse assessment. The purpose of the assessment is to assess a person s drug and/or alcohol use to determine if treatment is needed, what level of care is most appropriate, which program might best meet the client s needs, and to complete any insurance funding requirements.  We offer walk-in assessments, and assessments by appointment per the following schedule:  Weekday Assessments - Monday thru Friday, 7:00am to 1:45pm  [address] [place] [address]  Weekend Assessments - Saturday s, 7:45am to 10:45am  [address], [place] [address]   Appointments fill up fast and can only be taken up to 3 weeks in advance. Walk-in s are taken on a first come, first served basis. Clients with appointments should arrive 15 minutes early to complete paperwork. Appointment time slots will be given to a walk-in clients in the event the client does not arrive on time.  Assessments are free for those who qualify for Rule 25 funding, or we offer self-pay assessments that will meet court " "requirements for $100. For those with insurance, the assessment is typically covered however, co-pays and deductibles do apply. The assessment lasts about an hour and include questions about many different areas of a person s life. Whenever possible, the  also speaks to  collateral  which is a person that can also give information about the client being assessed.\"       If no appointments scheduled, explain Pt reports he has a psychiatrist and will schedule his own follow up    Attend all scheduled appointments with your outpatient providers. Call at least 24 hours in advance if you need to reschedule an appointment to ensure continued access to your outpatient providers.     Major Treatments, Procedures and Findings:  You were provided with: a psychiatric assessment, medication evaluation and/or management and milieu management    Symptoms to Report: increased confusion, losing more sleep, mood getting worse or thoughts of suicide    Early warning signs can include: increased depression or anxiety sleep disturbances increased thoughts or behaviors of suicide or self-harm  increased unusual thinking, such as paranoia or hearing voices    Safety and Wellness:  Take all medicines as directed.  Make no changes unless your doctor suggests them.      Follow treatment recommendations.  Refrain from alcohol and non-prescribed drugs.  If there is a concern for safety, call 911.    Resources:   Crisis Intervention: 125.272.8443 or 546-213-4595 (TTY: 242.427.2419).  Call anytime for help.  [place] on Mental Illness (www.[place].tara.org): 297.830.9014 or 967-816-2179.  Alcoholics Anonymous (www.alcoholics-anonymous.org): Check your phone book for your local chapter.  Suicide Awareness Voices of Education (SAVE) (www.save.org): 954-124-SAVE (7583)  [facility] Crisis (COPE) Response - Adult 745 740-3971  Crisis text line: Text \"[place]\" to 651842. Free, confidential, 24/7.  Crisis Intervention: 124.171.8859 or " 466.697.5855. Call anytime for help.     General Medication Instructions:   See your medication sheet(s) for instructions.   Take all medicines as directed.  Make no changes unless your doctor suggests them.   Go to all your doctor visits.  Be sure to have all your required lab tests. This way, your medicines can be refilled on time.  Do not use any drugs not prescribed by your doctor.  Avoid alcohol.    Advance Directives:   Scanned document on file with [place]? No scanned doc  Is document scanned? Current scanned  Honoring Choices Your Rights Handout: Informed and given  Was more information offered? Materials given    The Treatment team has appreciated the opportunity to work with you. If you have any questions or concerns about your recent admission, you can contact the unit which can receive your call 24 hours a day, 7 days a week. They will be able to get in touch with a Provider if needed. The unit number is [phone] .
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
## 2021-07-13 NOTE — PLAN OF CARE
Pt agitated at start of shift. Wanting to leave. Attempted to open door to stairwell but was easily directed back to room. Pt asked writer to page psychiatrist. Hiro was attempting to do this when pt came running out of his room being followed by 1:1, and pt again attempted to leave via the stairwell. This time pt was not redirectable and this writer attempted to intervene. Pt tried to push elevator button. A code 21 was called. Pt then sat in the wheelchair near elevator and refused to move. Pt yelled at writer for not paging psych. I notified pt I was in the process of doing that when he attempted to elope the unit. Pt agreed to sit in wheelchair near desk while writer paged psych. Psych was paged. I told pt he would need to wait in room until they call back. Pt refused. Code team was present. I told pt I was not negotiating with him and that he needed to either walk back to his room and be pushed in the wheelchair. Pt then allowed me to push him to his room in the chair where he got into bed. Code team then left. Pt argumentative with staff the rest of the time he was on the unit, but he did not attempt to leave again. Pt discharged to [facility] @ 1910.

## 2021-07-13 NOTE — PLAN OF CARE
\Assessment/Intervention/Current Symptoms and Care Coordination:   Reviewed chart and attended team discussion.  AVS completed.  Pt declined to participate in psychosocial indicating that he was 'fine' and was discharging today at 11am.    Discharge Plan or Goal:  Recommending pt have a CD evaluation.  Resources given.    Barriers to Discharge: none    Referral Status: gave resources.    Legal Status: 72HH is expiring

## 2021-07-13 NOTE — DISCHARGE SUMMARY
"History and Physical & Discharge Summary     Charles Chapa MRN# [id number]   Age: 31 year old YOB: 1990      Date of Admission:                  7/12/2021  Date of Discharge:                  7/13/2021          Contacts:      Mother - Donna Delarosa ([phone])    Sister -Lucille Chapa ([phone])    Psychiatry - Dr. Carolina Mcguire - [facility]          Diagnoses:      Methamphetamine-induced psychosis, resolved  Unspecified anxiety disorder  Benzodiazepine use disorder  Amphetamine use disorder  Cannabis use disorder  Opiate use disorder  Nicotine use disorder  Dental abscess  GERD          Recommendations:      Admit to Unit: 32N     Attending Physician: Dr. Uriostegui, under the direct care of Argentina Mitchell NP     Patient was admitted on a 72 hour mental health hold which expires 7/13 at 3:08 PM.       Monitor for target symptoms.      Provide a safe environment and therapeutic milieu.      Medications:  Continue Xanax 2 mg TID per his outpatient psychiatrist.  Restart Buspar 15 mg TID.  He says he has an ample supply of both these medications.  He declines to take Zyprexa.  The remainder of his Amoxicillin course was e-prescribed to his outpatient pharmacy.    Discharge to home today.  He will follow up with his outpatient psychiatrist.  He was given resources for obtaining a Rule 25.  He states his intent to go to CD treatment.  Discussed that future hospital visits with a similar presentation could show a pattern of risk of harm to self/others and result in a petition for commitment due to his substance use.        Attestation:  Patient has been seen and evaluated by me, Annmarie Mitchell, APRN CNP  The patient was counseled on nature of illness and treatment plan/options  Care was coordinated with treatment team, his sister Lucille            Chief Complaint:      History is obtained from the patient, his sister Lucille, and the electronic health record.     \"My 72-hour hold " "is up.  Can I go home today.\"          History of Present Illness:         Charles Chapa is a 31-year-old patient admitted to [facility] 32N on 2021.  He was admitted on a 72-hour hold from [address] [place] [address] where he had been hospitalized since 2021 due to agitated delirium.  He was originally admitted to Station 30N on 2021 due to insomnia and paranoia that people intended to kill him.  He had been carrying a knife with him.  UTOX was positive for amphetamines, benzodiazepines and cannabinoids.  During his brief hospitalization on 30N he was very agitated, ran into a wall, was unable to ambulate, and observed rolling around on the floor and was on status individual observation with 2:1 staffing.  While on the medical unit, he was sedated with Precedex.  Xanax was change from 2 mg TID to 1 mg TID; he had been filling it on a monthly basis.  Buspar was discontinued.  Zyprexa was initiated.  He received Ativan IV and Haldol IV/IM.       During the admission assessment, he said it was his birthday week and \"I ended up having a manic episode, psychosis, which I never took before.\"  He says he needs to go home to work to pay the family's mortgage because his mother is in [facility].  He states his intent to go to CD treatment \"in a week.\"  He reported that he had snorted 75 mg of a friend's Adderall prior to admission, was awake for 3 days straight, but denies regular use of amphetamines.  He denies any history of meth use.  He had been using cannabis daily.  He has been prescribed Xanax 2 mg TID x 2 years and reports taking it as prescribed.  He states he went to CD treatment for opiates in November and has used Percocet occasionally since then.    The patient's sister Lucille says he is underreporting her substance use.  She suspects he has been using heroin and meth.  She said that he is not eating well and is malnourished.  Some of his friends have  from overdoses.  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  She said he lost his job and is receiving unemployment.  She reported that his CD treatment last November was court ordered but she is not certain why.      Provider discussed with both the patient and his sister that future hospital visits with a similar presentation could show a pattern of risk of harm to self/others and result in a petition for commitment due to his substance use.          Psychiatric Review of Systems:     His mood is \"good, positive, happy.\"  He denies feeling anxious.  He denies suicidal and homicidal ideation.  His sleep is \"good.\"  His appetite is \"good.\"  He said he has been very thin his whole life.  Energy is good.  His concentration is good.  He denies symptoms consistent with OCD, PTSD, psychosis and damián.            Medical Review of Systems:      A 10-point review of systems was completed and is negative with the exception of HPI.            Psychiatric History:      He has a previous diagnosis of anxiety.  He has no prior history of psychiatric hospitalizations.  He denies any history of suicide attempts.  He had been receiving Xanax 2 mg TID x 2 years prior to his hospitalization.  He also takes Buspar.  He has no history of ECT or commitment.             Substance Use History:      He has a history of abusing opiates, cannabis, amphetamines and benzodiazepines.  He had accidental opioid overdoses in May 2020 and December 2020.  He reports that he has used opiates since then, and reports Percocet was prescribed after his wisdom teeth were removed.  He denies any history of IV use.  He went to CD treatment at [facility] in [place] in November 2020 for opiate use disorder.  His sister says this was court-ordered.  He denies any history of using meth, but his sister suspects he has been.  He states that he used Adderall just once prior to admission.  He denies any history of cocaine use.  He denies any history of abusing " "prescribed benzodiazepines.  His sister expressed concerns about diversion of prescribed Xanax.  He denies alcohol use.  He smokes cannabis daily.  He smokes cigarettes.            Past Medical History:      GERD    No history of seizures or head injuries.          Past Surgical History:      [place] teeth extraction  Cleft palate repair          Allergies:      No known allergies           Medications:      Per 8A Medical:      ALPRAZolam (XANAX) 2 MG tablet Take 1 tablet by mouth 3 times daily     amoxicillin (AMOXIL) 875 MG tablet Take 875 mg by mouth 2 times daily for 10 days     OLANZapine zydis (ZYPREXA) 5 MG ODT Take 1 tablet (5 mg) by mouth 2 times daily     omeprazole (PRILOSEC) 20 MG DR capsule TAKE 1 CAPSULE(20 MG) BY MOUTH DAILY     acetaminophen (TYLENOL) 325 MG tablet Take 2 tablets (650 mg) by mouth every 4 hours as needed for mild pain or fever     haloperidol lactate (HALDOL) 5 MG/ML injection Inject 1 mL (5 mg) into the vein every 6 hours as needed for agitation     LORazepam (ATIVAN) 2 MG/ML injection Inject 1 mL (2 mg) into the vein every 4 hours as needed for anxiety, seizures or agitation     OLANZapine zydis (ZYPREXA) 10 MG ODT Take 1 tablet (10 mg) by mouth once as needed for agitation          Social History:      He was born in [place], [place].  He grew up in the [facility], raised by his mother.  His father is in [place] and cannot come to the [place] due to immigration issues.  He has 2 older brothers and 2 older sisters.  One of his sisters  from drowning at age 14.  He lives in his mother's home.  His mother has been in [place] for 2 weeks and plans to return at the end of August; she traveled there with his brother so his brother could receive treatment for schizophrenia and \"he's not coming back.\"  He is single.  He has a 3-week-old daughter.  \"We're having a custody andrade.\"  He has an associate's degree from [employer].  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  He says he had a DWI in 2015, but his sister suspects more recent legal issues as he was court ordered to CD treatment in 2020.            Family History:      His brother has schizophrenia.  He denies any family history of substance abuse or suicides.            Labs:        Ref. Range 7/8/2021 14:06 7/8/2021 14:56 7/9/2021 00:02 7/9/2021 01:46 7/9/2021 07:46   WBC Latest Ref Range: 4.0 - 11.0 10e9/L 6.3           Hemoglobin Latest Ref Range: 13.3 - 17.7 g/dL 13.7           Hematocrit Latest Ref Range: 40.0 - 53.0 % 41.3           Platelet Count Latest Ref Range: 150 - 450 10e9/L 369           RBC Count Latest Ref Range: 4.4 - 5.9 10e12/L 4.36 (L)           MCV Latest Ref Range: 78 - 100 fl 95           MCH Latest Ref Range: 26.5 - 33.0 pg 31.4           MCHC Latest Ref Range: 31.5 - 36.5 g/dL 33.2           RDW Latest Ref Range: 10.0 - 15.0 % 12.2           Diff Method Unknown Automated Method           % Neutrophils Latest Units: % 63.1           % Lymphocytes Latest Units: % 22.2           % Monocytes Latest Units: % 9.0           % Eosinophils Latest Units: % 4.8           % Basophils Latest Units: % 0.6           % Immature Granulocytes Latest Units: % 0.3           Nucleated RBCs Latest Ref Range: 0 /100 0           Absolute Neutrophil Latest Ref Range: 1.6 - 8.3 10e9/L 4.0           Absolute Lymphocytes Latest Ref Range: 0.8 - 5.3 10e9/L 1.4           Absolute Monocytes Latest Ref Range: 0.0 - 1.3 10e9/L 0.6           Absolute Eosinophils Latest Ref Range: 0.0 - 0.7 10e9/L 0.3           Absolute Basophils Latest Ref Range: 0.0 - 0.2 10e9/L 0.0           Abs Immature Granulocytes Latest Ref Range: 0 - 0.4 10e9/L 0.0           Absolute Nucleated RBC Unknown 0.0           Glucose Latest Ref Range: 70 - 99 mg/dL 102 (H)   108 (H)   130 (H)   Sodium Latest Ref Range: 133 - 144 mmol/L 138       141   Potassium Latest Ref Range: 3.4 - 5.3 mmol/L 3.8        3.9   Chloride Latest Ref Range: 94 - 109 mmol/L 108       112 (H)   Carbon Dioxide Latest Ref Range: 20 - 32 mmol/L 29       21   Urea Nitrogen Latest Ref Range: 7 - 30 mg/dL 17       12   Creatinine Latest Ref Range: 0.66 - 1.25 mg/dL 0.87       0.73   GFR Estimate Latest Ref Range: >60 mL/min/1.73_m2 >90       >90   GFR Estimate If Black Latest Ref Range: >60 mL/min/1.73_m2 >90       >90   Calcium Latest Ref Range: 8.5 - 10.1 mg/dL 8.9       8.7   Anion Gap Latest Ref Range: 3 - 14 mmol/L 1 (L)       8   Magnesium Latest Ref Range: 1.6 - 2.3 mg/dL         2.0   Albumin Latest Ref Range: 3.4 - 5.0 g/dL 3.7       3.4   Protein Total Latest Ref Range: 6.8 - 8.8 g/dL 7.4       6.8   Bilirubin Total Latest Ref Range: 0.2 - 1.3 mg/dL 0.6       0.7   Alkaline Phosphatase Latest Ref Range: 40 - 150 U/L 100       89   ALT Latest Ref Range: 0 - 70 U/L 27       27   AST Latest Ref Range: 0 - 45 U/L 25       33   CK Total Latest Ref Range: 30 - 300 U/L         637 (H)   T4 Free Latest Ref Range: 0.76 - 1.46 ng/dL         1.19   TSH Latest Ref Range: 0.40 - 4.00 mU/L         0.17 (L)   Acetaminophen Level Latest Units: mg/L <2           Ethosuximide Level Latest Ref Range: 40.0 - 100.0 ug/ml         <10.0 (L)   Salicylate Level Latest Units: mg/dL <2           Amphetamine Qual Urine Latest Ref Range: NEG^Negative        Positive (A)     Barbiturates Qual Urine Latest Ref Range: NEG^Negative        Negative     Benzodiazepine Qual Urine Latest Ref Range: NEG^Negative        Positive (A)     Cannabinoids Qual Urine Latest Ref Range: NEG^Negative        Positive (A)     Cocaine Qual Urine Latest Ref Range: NEG^Negative        Negative     Opiates Qualitative Urine Latest Ref Range: NEG^Negative        Negative     Ethanol Qual Urine Latest Ref Range: NEG^Negative        Negative     SARS-CoV-2 PCR Result Unknown   NEGATIVE         SARS-CoV-2 Virus Specimen Source Unknown   Nasopharyngeal              Ref. Range 7/9/2021 08:37  "7/9/2021 12:23 7/9/2021 16:21 7/9/2021 23:47 7/10/2021 04:01   Glucose Latest Ref Range: 70 - 99 mg/dL 138 (H) 102 (H) 77 106 (H) 122 (H)        Ref. Range 7/10/2021 08:26 7/10/2021 12:04 7/10/2021 16:14 7/10/2021 20:14 7/11/2021 11:56   Glucose Latest Ref Range: 70 - 99 mg/dL 114 (H) 104 (H) 105 (H) 119 (H)     CK Total Latest Ref Range: 30 - 300 U/L         215              Psychiatric Examination:      Appearance:  awake, alert and dressed in hospital scrubs, very thin  Attitude:  evasive  Eye Contact:  good  Mood:  \"good, positive, happy\"  Affect:  normal range, irritable when confronted on inconsistencies between his reports and his sister's  Speech:  clear, coherent  Psychomotor Behavior:  no evidence of tardive dyskinesia, dystonia, or tics  Thought Process:  linear and goal oriented  Associations:  no loose associations  Thought Content:  no evidence of suicidal ideation or homicidal ideation and no evidence of psychotic thought  Insight:  limited  Judgment:  fair  Oriented to:  date, time, person, and place  Attention Span and Concentration:  intact  Recent and Remote Memory:  recent impairment related to substance use and psychosis  Language:  intact, fluent English  Fund of Knowledge:  appropriate  Muscle Strength and Tone:  normal  Gait and Station:  normal      /76 (BP Location: Right arm)   Pulse 79   Temp 99.5  F (37.5  C) (Oral)   Resp 18   SpO2 96%           Physical Exam:      Please refer to the physical exam completed by Dr. Coley on the medical unit 7/12/2021:    Constitutional: No distress noted, Alert, Answering questions appropriately  Respiratory: AE is good on both sides, no wheezing onr crackles  Cardiovascular: S1S2 normal, no new murmur  GI: Soft, non tender  Skin/Integumen: No rash       "

## 2021-07-13 NOTE — PLAN OF CARE
..Pt. States ready for discharge and is requesting discharge.  Pt. Reports no suicidal ideation, self-injurious behavior.  Pt. Reports that his thoughts are clear, reality based. Pt. States that he understands his therapeutic discharge plan and medication regime and has no concerns or questions.  Pt. States that he  will follow his medication regime and his therapeutic discharge plan.

## 2021-07-13 NOTE — PLAN OF CARE
"Pt refused anitbiotic. Pt stated \"No, I do not want it\". Attempted to discuss medication and pt again stated no. Pt then requesting the room to be warmer by turning up the heat.   "

## 2021-07-13 NOTE — PHARMACY-ADMISSION MEDICATION HISTORY
Please see Admission Medication History notes completed on 7/8/21 and 7/9/21 under previous encounter at [facility] for information regarding prior to admission medications.    Nicollette McMann, PharmD  [facility]: Ascom *[phone]

## 2021-07-13 NOTE — PLAN OF CARE
BEHAVIORAL TEAM DISCUSSION    Participants: Argentina Mitchell, RONEY; EMANUEL Feliciano; RN present  Progress: Pt is a 31 year old male admitted to [facility] station 32 on 21  Anticipated length of stay: pt is discharging today.    Continued Stay Criteria/Rationale: his 72HH is expiring and he requesting discharge  Medical/Physical: uneventful  Precautions:   Behavioral Orders   Procedures    Code 1 - Restrict to Unit    Elopement precautions    Routine Programming     As clinically indicated    Status 15     Every 15 minutes.     Plan: discharge to OP psychiatry and recommend CD assessment.  Rationale for change in precautions or plan: pt is not holdable and 72hh has

## 2021-07-13 NOTE — PROGRESS NOTES
Some of pt's belongings he came in with are unaccounted for. This RN and other staff have contacted all the other units the pt has been admitted to, 30N, 10 ICU, 8A and the ED, and none of them have been able to locate the missing items. Pt states he is missing his shoes, pants, cigarettes, and house keys. Provider and unit manager notified of situation. Pt is still requesting discharge and will be picked up by his sister this afternoon.

## 2021-07-13 NOTE — PROGRESS NOTES
"Pt was admitted to unit 32 on 7/12/21 around 1910. Pt was calm and cooperative with staff taking vitals signs and search. Pt stated \"I just need to go to bed. I'm not answering any more questions. They just gave me my Xanax, so I'm about to fall asleep.\" Per 8a MAR, last dose of Xanax given at 1705. Pt then began lying down on exam room table. Pt was shown to his room then requested his personal blanket. Pt was informed that he needs an order for his blanket. Pt was given extra blankets. Room temp was increased per pt request. Pt was observed visibly shivering. Pt dinner tray left at bedside table. Lipid panel and TSH ordered to be drawn tomorrow. Due to pt fatigue/sleepiness, unable to complete intake assessment, care plan, education, unit orientation, consent for admission. Nursing staff will continue to encourage participation in admission process.  "

## 2021-07-14 ENCOUNTER — PATIENT OUTREACH (OUTPATIENT)
Dept: CARE COORDINATION | Facility: CLINIC | Age: 31
End: 2021-07-14

## 2021-07-14 ENCOUNTER — TELEPHONE (OUTPATIENT)
Dept: FAMILY MEDICINE | Facility: CLINIC | Age: 31
End: 2021-07-14

## 2021-07-14 DIAGNOSIS — Z71.89 OTHER SPECIFIED COUNSELING: ICD-10-CM

## 2021-07-14 NOTE — TELEPHONE ENCOUNTER
Hospital F/U: RN to call  Pt was hospitalized at [facility] 07/12-07/13/2021  Chief Complaint: Dental Abscess

## 2021-07-14 NOTE — PROGRESS NOTES
Clinic Care Coordination Contact  [facility]/Voicemail     Clinical Data: Care Coordinator Outreach  Reason for referral: TCM outreach    Clinical Data: Care Coordinator Outreach  Outreach attempted x 1.  Left message on patient's voicemail with call back information and requested return call.  Plan:. Care Coordinator will try to reach patient again in 1-2 business days.    Rivka Morrow   Community Health Worker   Connected Care Resource [facility]

## 2021-07-15 NOTE — PROGRESS NOTES
Clinic Care Coordination Contact  [facility]/Voicemail       Clinical Data: Care Coordinator Outreach  Outreach attempted x 2.  Left message on patient's voicemail with call back information and requested return call.      Plan: Care Coordinator will do no further outreaches at this time.      Sarah Beth Mckay  [facility] Care Resource [facility]

## 2021-07-15 NOTE — TELEPHONE ENCOUNTER
Patient has care coordination through hospital and they have reached out to patient. No further follow up needed from PCP.     Devora Warren RN

## 2021-07-20 NOTE — DISCHARGE SUMMARY
[facility]  Discharge Summary    Charles Chapa MRN# [id number]   YOB: 1990 Age: 31 year old     Date of Admission:  7/9/2021  Date of Discharge:  7/12/2021  7:06 PM  Admitting Physician:  Kim Calderon MD  Discharge Physician:  Mejia Coley MD  Discharging Service:  Internal Medicine     Primary Provider: No Ref-Primary, Physician              Discharge Diagnosis:     Primary Discharge Diagnosis:    #Agitated delirium - AMS with paranoid psychosis likely 2/2 intoxicating substances   #Polysubstance abuse   #Concern for possible schizoaffective disorder (possible family hx per pt's sister)  #Nicotine dependence     #GERD, possible hx of H pylori  #Possible dental abscess   #Malnutrition Status:  Severe malnutrition in the context of chronic illness      Past Medical History:   Diagnosis Date     Substance abuse (H)                   Discharge Medications:     Discharge Medication List as of 7/12/2021  7:07 PM      START taking these medications    Details   acetaminophen (TYLENOL) 325 MG tablet Take 2 tablets (650 mg) by mouth every 4 hours as needed for mild pain or fever, Transitional      haloperidol lactate (HALDOL) 5 MG/ML injection Inject 1 mL (5 mg) into the vein every 6 hours as needed for agitation, Transitional      LORazepam (ATIVAN) 2 MG/ML injection Inject 1 mL (2 mg) into the vein every 4 hours as needed for anxiety, seizures or agitation, Transitional      !! OLANZapine zydis (ZYPREXA) 10 MG ODT Take 1 tablet (10 mg) by mouth once as needed for agitation, Transitional      !! OLANZapine zydis (ZYPREXA) 5 MG ODT Take 1 tablet (5 mg) by mouth 2 times daily, Transitional       !! - Potential duplicate medications found. Please discuss with provider.      CONTINUE these medications which have NOT CHANGED    Details   ALPRAZolam (XANAX) 2 MG tablet Take 1 tablet by mouth 3 times daily, Historical      omeprazole (PRILOSEC) 20 MG DR capsule TAKE 1 CAPSULE(20  MG) BY MOUTH DAILY, Disp-30 capsule, R-1, E-Prescribe      amoxicillin (AMOXIL) 875 MG tablet Take 875 mg by mouth 2 times daily for 10 days, Historical         STOP taking these medications       busPIRone (BUSPAR) 15 MG tablet Comments:   Reason for Stopping:         HYDROcodone-acetaminophen (NORCO) 5-325 MG tablet Comments:   Reason for Stopping:                    Hospital Course:     Charles Chapa is a 31 year old male who was admitted on 7/9/2021 with AMS, polysubstance abuse, paranoia and psychosis. He was transferred to the ICU for psychosis and need for intravenous medications. He was stabilized and then transferred to medicine floor. See ICU not for details.     #Agitated delirium - AMS with paranoid psychosis likely 2/2 intoxicating substances   #Polysubstance abuse   #Concern for possible schizoaffective disorder (possible family hx per pt's sister)     ? He was put on 72 hour hold active (initiated 7/8, expires Wednesday 7/14)   ? He was in ICU initially as he required IV medication to control his agiation  ? He had 1:1 sitter with psych associate at bedside   ? Work-up:    Chemistries unremarkable    Tylenol and salicylate levels negative    UDS + amphetamines, cannabinoids, and benzodiazepines   ? Treatment:    Haloperidol 5 mg IV q 6 hours prn    Ativan 2 mg IV q 4 prn  ? Consults:    Psychiatry consult, appreciate recs    Social work, appreciate recs      Chemical dependency consult- re-consult when patient more cooperative.  ? Home meds:     Resume alprazolam TID (@ 1/2 home dose) to prevent withdrawal, PDMP shows he fills this on a monthly basis although unclear if he is diverting some of this    Holding home buspirone until we can confirm his dose      #Nicotine dependence    ? Nicotine patch daily  ? Recommend tobacco cessation     #GERD, possible hx of H pylori   ? [employer] diet, no pork  ? Continue PTA omeprazole     #Possible dental abscess   Seen in the ED at [facility] 7/4 for jaw pain, he had a  cracked tooth and was given a 10-day course of amoxcillin and instructed to see a dentist ASAP.    WBC 6.3, afebrile thus far  No cultures  Continue amoxicillin 7/4 - 7/14        # Prophylaxis:    - Mechanical prophylaxis for DVT.   - Continue PTA omeprazole       Disposition:    Psych if psych team in agreement. Would be appropriate for tx to the floor     General Cares/Prophylaxis:    DVT Prophylaxis: Pneumatic Compression Devices  GI Prophylaxis: PPI  Restraints: Restraints for medical healing needed: Not Applicable              Discharge Disposition:   Discharged to home           Condition on Discharge:   Discharge condition: Stable   Code status on discharge: Full Code              Consultations:   Consultation during this admission received from psychiatry               Final Day of Progress before Discharge:     Physical Exam:    Blood pressure 124/76, pulse 79, temperature 99.5  F (37.5  C), temperature source Oral, resp. rate 18, SpO2 96 %.  GENERAL: Alert and oriented x 3. NAD.   HEENT: Anicteric sclera. PERRL. Mucous membranes moist and without lesions.   CV: RRR. S1, S2. No murmurs appreciated.   RESPIRATORY: Effort normal. Lungs CTAB with no wheezing, rales, rhonchi.   GI: Abdomen soft and non distended with normoactive bowel sounds present in all quadrants. No tenderness, rebound, guarding.      Data:  All laboratory data reviewed             Significant Results:     Results for orders placed or performed during the hospital encounter of 07/09/21   Comprehensive metabolic panel     Status: Abnormal   Result Value Ref Range    Sodium 141 133 - 144 mmol/L    Potassium 3.9 3.4 - 5.3 mmol/L    Chloride 112 (H) 94 - 109 mmol/L    Carbon Dioxide 21 20 - 32 mmol/L    Anion Gap 8 3 - 14 mmol/L    Glucose 130 (H) 70 - 99 mg/dL    Urea Nitrogen 12 7 - 30 mg/dL    Creatinine 0.73 0.66 - 1.25 mg/dL    GFR Estimate >90 >60 mL/min/[1.73_m2]    GFR Estimate If Black >90 >60 mL/min/[1.73_m2]    Calcium 8.7 8.5 - 10.1  mg/dL    Bilirubin Total 0.7 0.2 - 1.3 mg/dL    Albumin 3.4 3.4 - 5.0 g/dL    Protein Total 6.8 6.8 - 8.8 g/dL    Alkaline Phosphatase 89 40 - 150 U/L    ALT 27 0 - 70 U/L    AST 33 0 - 45 U/L   Magnesium     Status: None   Result Value Ref Range    Magnesium 2.0 1.6 - 2.3 mg/dL   CK total     Status: Abnormal   Result Value Ref Range    CK Total 637 (H) 30 - 300 U/L   TSH with free T4 reflex     Status: Abnormal   Result Value Ref Range    TSH 0.17 (L) 0.40 - 4.00 mU/L   Glucose by meter     Status: Abnormal   Result Value Ref Range    Glucose 138 (H) 70 - 99 mg/dL   T4 free     Status: None   Result Value Ref Range    T4 Free 1.19 0.76 - 1.46 ng/dL   Glucose by meter     Status: Abnormal   Result Value Ref Range    Glucose 102 (H) 70 - 99 mg/dL   Glucose by meter     Status: None   Result Value Ref Range    Glucose 77 70 - 99 mg/dL   Glucose by meter     Status: Abnormal   Result Value Ref Range    Glucose 106 (H) 70 - 99 mg/dL   Glucose by meter     Status: Abnormal   Result Value Ref Range    Glucose 122 (H) 70 - 99 mg/dL   Glucose by meter     Status: Abnormal   Result Value Ref Range    Glucose 114 (H) 70 - 99 mg/dL   Glucose by meter     Status: Abnormal   Result Value Ref Range    Glucose 104 (H) 70 - 99 mg/dL   Glucose by meter     Status: Abnormal   Result Value Ref Range    Glucose 105 (H) 70 - 99 mg/dL   Glucose by meter     Status: Abnormal   Result Value Ref Range    Glucose 119 (H) 70 - 99 mg/dL   CK total     Status: None   Result Value Ref Range    CK Total 215 30 - 300 U/L   EKG 12-lead, complete     Status: None   Result Value Ref Range    Interpretation ECG Click View Image link to view waveform and result    EKG 12-lead, tracing only     Status: None   Result Value Ref Range    Interpretation ECG Click View Image link to view waveform and result    Chemical Dependency IP Consult     Status: None ()    Mya Thompson LADC     7/9/2021  7:39 AM  7/9/2021    CD consult closing.  Pt  is not appropriate for BRENNEN eval or discussion about services   due to withdrawal and being on precedex.   Patients must be able to actively and appropriately participate   in the evaluation process including: maintaining cognitive focus   & recall over hour-long interview, providing responses that are   coherent and accurate, and managing emotional upset.    A consult can be re-ordered when pt is appropriate for   conversation and reports he wants residential treatment.     CECELIA Vasquez  [email or web address]  [phone]     Psychiatry IP Consult: psychosis, agitation, 72hr hold; Consultant may enter orders: Yes; Patient to be seen: ASAP; Call back #: [id number]; Requesting provider? Hospitalist (if different from attending physician)     Status: None ()    Narrative    Neville Kay CNP     7/9/2021 12:18 PM    [facility]   Initial Psychiatric Consult   Consult date: July 9, 2021         Reason for Consult, requesting source:    Psychosis, agitation, 72 hour hold  Requesting source: Kim Calderon        HPI:   Mr. Charles Chapa is a 31 year old male who was admitted to   [facility] on 7/8/21 after presenting to the emergency   department for evaluation of altered mental status. Pt has   apparently not been sleeping, thinking that people are trying to   kill him.  PMH significant for polysubstance use, including   opiates, cannabis, amphetamines, and benzodiazepines. Hx of   opioid overdose in December 2020, had been smoking fentanyl.   Psychiatry is consulted for evaluation of psychosis and   agitation.     Per chart review, patient has apparently been at home alone   recently. Had been staying with mother but she is currently in   [place]. The sister reported to staff that patient has not been   sleeping, has been appearing increasingly paranoid, worried that   people are trying to kill him. Had been walking around the house   with a knife, trying to  find these people. Sister reports a fam   hx of schizoaffective disorder. Pt's utox was positive for   amphetamines, cannabis, and benzos on admission.     Pt initially admitted to inpatient psychiatry; however, there was   concern for delirium and agitation. Code green was called   overnight, patient was then transferred to the ICU in restraints.   Patient is prescribed alprazolam 2 mg TID, which he has been   filling monthly per the PDMP database. Sister has expressed   concern to the ICU staff that he has been selling the alprazolam.   It is unclear how much he has been taking himself. He was placed   on a Precedex drip overnight last night due to his agitation. He   is currently unable to take medications by mouth. When attempting   to wean Precedex, patient has become quite agitated.     On evaluation today, patient is seen lying in his hospital bed,   sedated. His eyes are open but he is not responsive at all. He is   not rigid during passive ROM. He is quite cachectic appearing,   with a BMI of 14.4.         Past Psychiatric History:   No known hx of psychiatric hospitalizations or suicide attempts.   Appears he is seeing a psychiatrist named Carolina Mcguire MD. He   is receiving a monthly prescription for Xanax 2 mg three times   per day, last filled 7/5/21. Per sister, there is concern that he   is diverting this medication. Appears he is also prescribed   buspirone. No hx of ECT or MH commitment.         Substance Use and History:   Patient has history of polysubstance use. Had opioid overdoses in   May and December 2020, requiring Narcan reversal. Utox on   admission positive for amphetamines, benzodiazepines, and   cannabinoids. Unclear if he has ever been to CD treatment.         Past Medical History:   PAST MEDICAL HISTORY:   Past Medical History:   Diagnosis Date     Substance abuse (H)        PAST SURGICAL HISTORY: No past surgical history on file.          Family History:   FAMILY HISTORY:   Family  History   Problem Relation Age of Onset     Arthritis Mother      Diabetes Brother      Hypertension No family hx of    Per sister, family hx of schizoaffective disorder        Social History:   Pt apparently living at mother's home but mother is currently in   [place]. The chart suggests that he does not work. Has a sister   in the cities.          Physical ROS:   The patient was unable to participate in ROS due to sedation.          PTA Medications:     Medications Prior to Admission   Medication Sig Dispense Refill Last Dose     ALPRAZolam (XANAX) 2 MG tablet Take 1 tablet by mouth 3 times   daily        amoxicillin (AMOXIL) 875 MG tablet Take 875 mg by mouth 2 times   daily for 10 days        busPIRone (BUSPAR) 15 MG tablet Take 15 mg by mouth 3 times   daily         HYDROcodone-acetaminophen (NORCO) 5-325 MG tablet Take 1 tablet   by mouth every 6 hours as needed for pain        omeprazole (PRILOSEC) 20 MG DR capsule TAKE 1 CAPSULE(20 MG) BY   MOUTH DAILY 30 capsule 1           Allergies:   No Known Allergies       Labs:     Recent Results (from the past 48 hour(s))   CBC with platelets differential    Collection Time: 07/08/21  2:06 PM   Result Value Ref Range    WBC 6.3 4.0 - 11.0 10e9/L    RBC Count 4.36 (L) 4.4 - 5.9 10e12/L    Hemoglobin 13.7 13.3 - 17.7 g/dL    Hematocrit 41.3 40.0 - 53.0 %    MCV 95 78 - 100 fl    MCH 31.4 26.5 - 33.0 pg    MCHC 33.2 31.5 - 36.5 g/dL    RDW 12.2 10.0 - 15.0 %    Platelet Count 369 150 - 450 10e9/L    Diff Method Automated Method     % Neutrophils 63.1 %    % Lymphocytes 22.2 %    % Monocytes 9.0 %    % Eosinophils 4.8 %    % Basophils 0.6 %    % Immature Granulocytes 0.3 %    Nucleated RBCs 0 0 /100    Absolute Neutrophil 4.0 1.6 - 8.3 10e9/L    Absolute Lymphocytes 1.4 0.8 - 5.3 10e9/L    Absolute Monocytes 0.6 0.0 - 1.3 10e9/L    Absolute Eosinophils 0.3 0.0 - 0.7 10e9/L    Absolute Basophils 0.0 0.0 - 0.2 10e9/L    Abs Immature Granulocytes 0.0 0 - 0.4 10e9/L     Absolute Nucleated RBC 0.0    Comprehensive metabolic panel    Collection Time: 07/08/21  2:06 PM   Result Value Ref Range    Sodium 138 133 - 144 mmol/L    Potassium 3.8 3.4 - 5.3 mmol/L    Chloride 108 94 - 109 mmol/L    Carbon Dioxide 29 20 - 32 mmol/L    Anion Gap 1 (L) 3 - 14 mmol/L    Glucose 102 (H) 70 - 99 mg/dL    Urea Nitrogen 17 7 - 30 mg/dL    Creatinine 0.87 0.66 - 1.25 mg/dL    GFR Estimate >90 >60 mL/min/[1.73_m2]    GFR Estimate If Black >90 >60 mL/min/[1.73_m2]    Calcium 8.9 8.5 - 10.1 mg/dL    Bilirubin Total 0.6 0.2 - 1.3 mg/dL    Albumin 3.7 3.4 - 5.0 g/dL    Protein Total 7.4 6.8 - 8.8 g/dL    Alkaline Phosphatase 100 40 - 150 U/L    ALT 27 0 - 70 U/L    AST 25 0 - 45 U/L   Salicylate level    Collection Time: 07/08/21  2:06 PM   Result Value Ref Range    Salicylate Level <2 mg/dL   Acetaminophen level    Collection Time: 07/08/21  2:06 PM   Result Value Ref Range    Acetaminophen Level <2 mg/L   Asymptomatic SARS-CoV-2 COVID-19 Virus (Coronavirus) by PCR    Collection Time: 07/08/21  2:56 PM    Specimen: Nasopharyngeal   Result Value Ref Range    SARS-CoV-2 Virus Specimen Source Nasopharyngeal     SARS-CoV-2 PCR Result NEGATIVE     SARS-CoV-2 PCR Comment (Note)    Glucose by meter    Collection Time: 07/09/21 12:02 AM   Result Value Ref Range    Glucose 108 (H) 70 - 99 mg/dL   Drug abuse screen 6 urine (chem dep)    Collection Time: 07/09/21  1:46 AM   Result Value Ref Range    Amphetamine Qual Urine Positive (A) NEG^Negative    Barbiturates Qual Urine Negative NEG^Negative    Benzodiazepine Qual Urine Positive (A) NEG^Negative    Cannabinoids Qual Urine Positive (A) NEG^Negative    Cocaine Qual Urine Negative NEG^Negative    Ethanol Qual Urine Negative NEG^Negative    Opiates Qualitative Urine Negative NEG^Negative   Comprehensive metabolic panel    Collection Time: 07/09/21  7:46 AM   Result Value Ref Range    Sodium 141 133 - 144 mmol/L    Potassium 3.9 3.4 - 5.3 mmol/L    Chloride 112 (H)  94 - 109 mmol/L    Carbon Dioxide 21 20 - 32 mmol/L    Anion Gap 8 3 - 14 mmol/L    Glucose 130 (H) 70 - 99 mg/dL    Urea Nitrogen 12 7 - 30 mg/dL    Creatinine 0.73 0.66 - 1.25 mg/dL    GFR Estimate >90 >60 mL/min/[1.73_m2]    GFR Estimate If Black >90 >60 mL/min/[1.73_m2]    Calcium 8.7 8.5 - 10.1 mg/dL    Bilirubin Total 0.7 0.2 - 1.3 mg/dL    Albumin 3.4 3.4 - 5.0 g/dL    Protein Total 6.8 6.8 - 8.8 g/dL    Alkaline Phosphatase 89 40 - 150 U/L    ALT 27 0 - 70 U/L    AST 33 0 - 45 U/L   Magnesium    Collection Time: 07/09/21  7:46 AM   Result Value Ref Range    Magnesium 2.0 1.6 - 2.3 mg/dL   CK total    Collection Time: 07/09/21  7:46 AM   Result Value Ref Range    CK Total 637 (H) 30 - 300 U/L   TSH with free T4 reflex    Collection Time: 07/09/21  7:46 AM   Result Value Ref Range    TSH 0.17 (L) 0.40 - 4.00 mU/L   T4 free    Collection Time: 07/09/21  7:46 AM   Result Value Ref Range    T4 Free 1.19 0.76 - 1.46 ng/dL   Glucose by meter    Collection Time: 07/09/21  8:37 AM   Result Value Ref Range    Glucose 138 (H) 70 - 99 mg/dL   EKG 12-lead, complete    Collection Time: 07/09/21 10:10 AM   Result Value Ref Range    Interpretation ECG Click View Image link to view waveform and   result           Physical and Psychiatric Examination:     /81   Pulse 52   Temp 97.5  F (36.4  C) (Oral)   Resp 17     SpO2 94%   Weight is 0 lbs 0 oz  There is no height or weight on file to   calculate BMI.    Physical Exam:  I have reviewed the physical exam as documented by by the medical   team and agree with findings and assessment and have no   additional findings to add at this time.    Mental Status Exam:    Appearance: age-appearing, dressed in orange scrubs, eyes open   but sedated, poor dentition  Behavior: sedated, not responding to verbal stimuli  Orientation: unable to assess due to sedation  Movements: no movement observed, muscle tone is diminished  Mood: MIRIAM  Affect: flat, eyes and mouth open  Speech:  not speaking  Memory: unable to assess  Fund of knowledge: unable to assess  Concentration: unable to assess  Thought process and content: unable to assess  Insight: unable to assess  Judgement: unable to assess  Safety: at risk for agitation, has become combative. No   self-injurious behavior observed at this point.          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 Charlse Chapa is a 31 year old male who was admitted to   [facility] on 7/8/21 after presenting to the emergency   department for evaluation of altered mental status. Pt has   apparently not been sleeping, thinking that people are trying to   kill him.  PMH significant for polysubstance use, including   opiates, cannabis, amphetamines, and benzodiazepines. Hx of   opioid overdose in December 2020, had been smoking fentanyl.     Pt is currently sedated and unable to communicate. He is on a   Precedex drip and has become agitated when attempting to wean. He   is currently unable to take any medications by mouth. Appears he   receives a prescription for alprazolam 2 mg TID from an   outpatient psychiatrist. There was concern expressed by sister   that he may be diverting this medication. It is unclear how much   he has been taking himself. His presentation is likely a result   of polysubstance use. The report from sister was that he had not   been sleeping, had been walking around the house with a knife   trying to find the people that he thinks are trying to kill him.   This would be consistent with a methamphetamine-induced   psychosis. Apparently there is a family hx of schizoaffective   disorder, but given the fact that he has been using meth and   various other substances, I think it is more likely that this is   substance-induced. He does have a  history of at least two opioid   overdoses last year, requiring Narcan reversal. Does not appear   that he has ever been committed.     Pt is very thin, which could also be related to meth use. Appears   that in January 2020, patient weighed 127 lbs, so has lost about   15 pounds since that time. Will need to monitor his nutritional   status. Serum protein and albumin are on the lower end of normal.       ECG completed this morning, QTc 477.     For now, recommend we administer IV haloperidol 5 mg every 6   hours in an attempt to wean the Precedex. He is not taking   medications by mouth currently and does not have an NG tube so IV   route would be the safest option. Will need to keep an eye on his   QTc while using haloperidol, would recheck ECG tomorrow.     Will also need to monitor for benzodiazepine withdrawal as he has   unfortunately been receiving large quantities of alprazolam by an   outpatient psychiatrist in [place].           Summary of Recommendations:   1) For now, would utilize IV haloperidol 5 mg q6h scheduled until   we can successfully wean him off Precedex. Switch to oral when he   is able to take by mouth. Would avoid anticholinergics as able as   these can worsen delirium. If he is experiencing any dystonia,   will need to administer diphenhydramine 50 mg IM or IV.     2) Recommend psychiatric reevaluation when he is awake and able   to communicate.     3) Will need to monitor for benzodiazepine withdrawal given the   large quantities of alprazolam he has been receiving - would   start MSSA monitoring with symptom-triggered lorazepam po or IV    4) Will need to defer medical decision making to next-of-kin   until he is awake and able to communicate decisions regarding his   care    5) Recommend to continue 72 hour hold until he clears - this will   be valid until Wednesday 7/14 at 3:30 AM    6) Hold buspirone until improving clinically    7) On-call psychiatrist will be available over the  weekend.   Please place a new consult if patient needs to be seen again.      ROSS Wren CNP  [facility]   Contact information available via [facility] Paging/Directory        Psychiatry IP Consult: may now be appropriate for inpatient psych. Discussed with psychiatrist on the unit; Consultant may enter orders: Yes; Patient to be seen: Routine; Call back #: [id number]; Requesting provider? Attending physician     Status: None ()    Narrative    Yung Hudson MD     7/10/2021  4:47 PM            Psychiatry Consultation; Follow up              Reason for Consult, requesting source:    Psychosis. He was seen by Neville Kay CNP from our service   yesterday.   Requesting source: Kim Calderon    Labs and imaging reviewed, reviewed with nursing primary team.                Interim history:    When the patient was interviewed today he was for the most part   covering his head with a blanket.  Did reluctantly say a few   words, but for the most part would not cooperate with an   interview.  Per his bedside attendant, he is mostly sleeping but   occasionally quite restless.  He is not agitated or responding to   visual hallucinations.  As you can see from Neville Kay's note from yesterday he was   quite disorganized and very paranoid on admission.  He also is on   a 72-hour hold.   He is now off Precedex and I have been assured that he is   medically stable.           Current Medications:     Current Facility-Administered Medications   Medication     acetaminophen (TYLENOL) tablet 650 mg     ALPRAZolam (XANAX) tablet 1 mg     amoxicillin (AMOXIL) tablet 875 mg     [Held by provider] busPIRone (BUSPAR) tablet 15 mg     glucose gel 15-30 g    Or     dextrose 50 % injection 25-50 mL    Or     glucagon injection 1 mg     haloperidol lactate (HALDOL) injection 5 mg     lactated ringers infusion     LORazepam (ATIVAN) injection 2 mg     OLANZapine zydis (zyPREXA)  "ODT tab 10 mg     OLANZapine zydis (zyPREXA) ODT tab 5 mg     omeprazole (priLOSEC) CR capsule 20 mg     ondansetron (ZOFRAN) injection 4 mg              MSE:     Lying quietly in the hospital bed with sheet over his head.   Reluctantly moves the sheet to talk, but minimal interaction.   Grooming ok, low BMI noted.      Vital signs:  Temp: 99.4  F (37.4  C) Temp src: Oral BP: (!) 129/95 Pulse: 74     Resp: 16 SpO2: 99 % O2 Device: None (Room air)        Estimated body mass index is 14.43 kg/m  as calculated from the   following:    Height as of 7/8/21: 1.88 m (6' 2\").    Weight as of 7/8/21: 51 kg (112 lb 6.4 oz).              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

## 2022-07-04 ENCOUNTER — HOSPITAL ENCOUNTER (EMERGENCY)
Facility: HOSPITAL | Age: 32
Discharge: HOME | End: 2022-07-04
Attending: EMERGENCY MEDICINE | Admitting: EMERGENCY MEDICINE
Payer: COMMERCIAL

## 2022-07-04 VITALS
WEIGHT: 135 LBS | DIASTOLIC BLOOD PRESSURE: 78 MMHG | HEART RATE: 92 BPM | RESPIRATION RATE: 18 BRPM | SYSTOLIC BLOOD PRESSURE: 118 MMHG | OXYGEN SATURATION: 98 % | TEMPERATURE: 98.3 F | BODY MASS INDEX: 16.87 KG/M2

## 2022-07-04 DIAGNOSIS — F11.10 OPIATE ABUSE, EPISODIC (H): ICD-10-CM

## 2022-07-04 DIAGNOSIS — F11.93 OPIATE WITHDRAWAL (H): ICD-10-CM

## 2022-07-04 PROBLEM — F50.82 AVOIDANT-RESTRICTIVE FOOD INTAKE DISORDER (ARFID): Status: ACTIVE | Noted: 2020-03-04

## 2022-07-04 PROBLEM — E55.9 VITAMIN D INSUFFICIENCY: Status: ACTIVE | Noted: 2020-03-05

## 2022-07-04 PROBLEM — F12.20 CANNABIS USE DISORDER, SEVERE, DEPENDENCE (H): Status: ACTIVE | Noted: 2020-03-05

## 2022-07-04 PROBLEM — Q35.9 CLEFT PALATE: Status: ACTIVE | Noted: 2020-04-08

## 2022-07-04 PROBLEM — R63.6 UNDERWEIGHT: Status: ACTIVE | Noted: 2020-03-05

## 2022-07-04 PROCEDURE — 99284 EMERGENCY DEPT VISIT MOD MDM: CPT

## 2022-07-04 RX ORDER — ALPRAZOLAM 2 MG
2 TABLET ORAL 3 TIMES DAILY PRN
Qty: 9 TABLET | Refills: 0 | Status: SHIPPED | OUTPATIENT
Start: 2022-07-04 | End: 2022-07-07

## 2022-07-04 RX ORDER — HYDROXYZINE PAMOATE 50 MG/1
50 CAPSULE ORAL 3 TIMES DAILY PRN
Qty: 21 CAPSULE | Refills: 0 | Status: SHIPPED | OUTPATIENT
Start: 2022-07-04 | End: 2023-09-05

## 2022-07-04 RX ORDER — DICYCLOMINE HCL 20 MG
20 TABLET ORAL 4 TIMES DAILY PRN
Qty: 24 TABLET | Refills: 0 | Status: SHIPPED | OUTPATIENT
Start: 2022-07-04 | End: 2022-07-14

## 2022-07-04 RX ORDER — ONDANSETRON 4 MG/1
4 TABLET, ORALLY DISINTEGRATING ORAL EVERY 6 HOURS PRN
Qty: 21 TABLET | Refills: 0 | Status: SHIPPED | OUTPATIENT
Start: 2022-07-04 | End: 2023-09-05

## 2022-07-04 ASSESSMENT — ENCOUNTER SYMPTOMS
DIZZINESS: 0
ABDOMINAL PAIN: 0
HEMATURIA: 0
DIARRHEA: 1
DYSURIA: 0
CONFUSION: 0
APPETITE CHANGE: 1
NAUSEA: 0
SHORTNESS OF BREATH: 0
FEVER: 0
CHILLS: 0
VOMITING: 1
JOINT SWELLING: 0
SORE THROAT: 0

## 2022-07-04 NOTE — DISCHARGE INSTRUCTIONS
Continue treatment with your program and have them help you get into a regular suboxone treatment program if that's what you desire.  Take medications I've prescribed for your symptoms as directed.  I've given you a short refill for your xanax. This can help your insomnia as well.

## 2022-07-04 NOTE — ED PROVIDER NOTES
Emergency Department Encounter     Evaluation Date & Time:   7/4/2022  9:16 AM    CHIEF COMPLAINT:  Withdrawal      Triage Note:Patient presents here for evaluation and treatment of withdrawal symptoms related to fentanyl use. He last used last Thursday and has been taking Cyboxone (that he states that he got at [facility] months ago) which has not been helping. He has been vomiting and has gotten little sleep.                ED COURSE & MEDICAL DECISION MAKING:        Pt presenting for treatment for opiate withdrawal symptoms he's been having since stopping smoking fentanyl last Thursday (4 days ago).  Pt reports n/v/d, chills, insomnia.  Pt has been using an old suboxone Rx he got from a different ED many months ago, but feels dosing isn't high enough.  He's in a chem dep treatment program, but has not got into a suboxone program. Vitals reassuring, nontoxic appearing.  I am unable to prescribe suboxone, which I discussed with pt, encouraged him to seek further outpatient suboxone treatment through his program.  Agreed to Rx for meds to help manage symptoms.  Rx for zofran, hydroxyzine, bentyl given and I refilled some of his Xanax he was previously only.  No indication for further ED workup or hospitalization.    9:31 AM I met with the patient to gather history and to perform my initial exam. I discussed the plan for care while in the Emergency Department. I discussed the plan for discharge with the patient, and patient is agreeable.  We discussed supportive cares at home and reasons for return to the ER including new or worsening symptoms - all questions and concerns addressed.  Patient to be discharged by RN.     At the conclusion of the encounter I discussed the results of all the tests and the disposition. The questions were answered. The patient or family acknowledged understanding and was agreeable with the care plan.      MEDICATIONS GIVEN IN THE EMERGENCY DEPARTMENT:  Medications - No data to  display    NEW PRESCRIPTIONS STARTED AT TODAY'S ED VISIT:  Discharge Medication List as of 7/4/2022 10:33 AM      START taking these medications    Details   !! ALPRAZolam (XANAX) 2 MG tablet Take 1 tablet (2 mg) by mouth 3 times daily as needed for anxiety, Disp-9 tablet, R-0, Local Print      dicyclomine (BENTYL) 20 MG tablet Take 1 tablet (20 mg) by mouth 4 times daily as needed (abodminal pain/cramping), Disp-24 tablet, R-0, Local Print      hydrOXYzine (VISTARIL) 50 MG capsule Take 1 capsule (50 mg) by mouth 3 times daily as needed for anxiety, Disp-21 capsule, R-0, Local Print      ondansetron (ZOFRAN ODT) 4 MG ODT tab Take 1 tablet (4 mg) by mouth every 6 hours as needed for nausea, Disp-21 tablet, R-0, Local Print       !! - Potential duplicate medications found. Please discuss with provider.          HPI   The history is provided by the patient. No  was used.        Charles Chapa is a 31 year old male with a pertinent history of substance abuse, cannabis use, and a heart murmur who presents to this ED by EMS for evaluation of withdrawal.     Patient states being in withdrawal from fentanyl. Notes he last used on 6/30/2022, and has been taking 8 mg suboxone since then. Endorses he got it at [facility] months ago, and states the dose isn't high enough and he needs 12 mg. Patient endorses having shivers, sleeplessness, vomiting, and diarrhea. Notes he hasn't eaten in two days. Patient states he is in a treatment program. Patient has a psychiatrist who previously prescribed him xanax for anxiety, but states he currently doesn't have a prescription for it, but would like some to help him sleep. Patient denies any other complaints at this time.     REVIEW OF SYSTEMS:  Review of Systems   Constitutional: Positive for appetite change (Decreased). Negative for chills and fever.        Positive for sleeplessness and shivers   HENT: Negative for sore throat.    Eyes: Negative for visual  disturbance.   Respiratory: Negative for shortness of breath.    Cardiovascular: Negative for chest pain.   Gastrointestinal: Positive for diarrhea and vomiting. Negative for abdominal pain and nausea.   Endocrine: Negative for polyuria.   Genitourinary: Negative for dysuria and hematuria.        - urinary changes   Musculoskeletal: Negative for joint swelling.   Skin: Negative for rash.   Neurological: Negative for dizziness.   Psychiatric/Behavioral: Negative for confusion.   All other systems reviewed and are negative.        Medical History     Past Medical History:   Diagnosis Date     Substance abuse (H)        History reviewed. No pertinent surgical history.    Family History   Problem Relation Age of Onset     Arthritis Mother      Diabetes Brother      Hypertension No family hx of        Social History     Tobacco Use     Smoking status: Former Smoker     Quit date: 2018     Years since quittin.1     Smokeless tobacco: Never Used   Substance Use Topics     Alcohol use: No     Drug use: No       ALPRAZolam (XANAX) 2 MG tablet  dicyclomine (BENTYL) 20 MG tablet  hydrOXYzine (VISTARIL) 50 MG capsule  ondansetron (ZOFRAN ODT) 4 MG ODT tab  ALPRAZolam (XANAX) 2 MG tablet  amoxicillin (AMOXIL) 875 MG tablet  busPIRone (BUSPAR) 15 MG tablet  omeprazole (PRILOSEC) 20 MG DR capsule        Physical Exam     Vitals:  /78   Pulse 92   Temp 98.3  F (36.8  C) (Oral)   Resp 18   Wt 61.2 kg (135 lb)   SpO2 98%   BMI 16.87 kg/m      PHYSICAL EXAM:   Physical Exam  Vitals and nursing note reviewed.   Constitutional:       General: He is not in acute distress.     Appearance: Normal appearance.   HENT:      Head: Normocephalic and atraumatic.      Nose: Nose normal.      Mouth/Throat:      Mouth: Mucous membranes are moist.   Eyes:      Extraocular Movements: Extraocular movements intact.   Cardiovascular:      Rate and Rhythm: Normal rate and regular rhythm.      Pulses: Normal pulses.           Radial  pulses are 2+ on the right side and 2+ on the left side.        Dorsalis pedis pulses are 2+ on the right side and 2+ on the left side.   Pulmonary:      Effort: Pulmonary effort is normal.   Skin:     Findings: No rash.   Neurological:      General: No focal deficit present.      Mental Status: He is alert. Mental status is at baseline.      Comments: Fluent speech   Psychiatric:         Mood and Affect: Mood normal.         Behavior: Behavior normal.         Results     LAB:  All pertinent labs reviewed and interpreted  Labs Ordered and Resulted from Time of ED Arrival to Time of ED Departure - No data to display    RADIOLOGY:  No orders to display                  PROCEDURES:  Procedures:  none      FINAL IMPRESSION:    ICD-10-CM    1. Opiate abuse, episodic (H)  F11.10    2. Opiate withdrawal (H)  F11.23        0 minutes of critical care time      I, Alyssa Ogden, am serving as a scribe to document services personally performed by Dr. Antwon Gilman, based on my observations and the provider's statements to me. I, Antwon Gilman, DO attest that Alyssa Ogden is acting in a scribe capacity, has observed my performance of the services and has documented them in accordance with my direction.      Antwon Gilman DO  Emergency Medicine  [facility] EMERGENCY DEPARTMENT  7/4/2022  9:26 AM        Antwon Gilman MD  07/04/22 1519

## 2022-07-04 NOTE — ED TRIAGE NOTES
Patient presents here for evaluation and treatment of withdrawal symptoms related to fentanyl use. He last used last Thursday and has been taking Cyboxone (that he states that he got at [facility] months ago) which has not been helping. He has been vomiting and has gotten little sleep.

## 2023-09-05 ENCOUNTER — VIRTUAL VISIT (OUTPATIENT)
Dept: FAMILY MEDICINE | Facility: CLINIC | Age: 33
End: 2023-09-05
Payer: COMMERCIAL

## 2023-09-05 DIAGNOSIS — G47.00 INSOMNIA, UNSPECIFIED TYPE: Primary | ICD-10-CM

## 2023-09-05 PROCEDURE — 99214 OFFICE O/P EST MOD 30 MIN: CPT | Mod: VID | Performed by: FAMILY MEDICINE

## 2023-09-05 RX ORDER — TRAZODONE HYDROCHLORIDE 50 MG/1
50 TABLET, FILM COATED ORAL AT BEDTIME
Qty: 60 TABLET | Refills: 0 | Status: SHIPPED | OUTPATIENT
Start: 2023-09-05 | End: 2024-01-17

## 2023-09-05 RX ORDER — HYDROXYZINE PAMOATE 50 MG/1
50 CAPSULE ORAL
Qty: 30 CAPSULE | Refills: 0 | Status: SHIPPED | OUTPATIENT
Start: 2023-09-05 | End: 2024-01-17

## 2023-09-05 NOTE — PATIENT INSTRUCTIONS
Establish care with a primary care provider/clinic at your soonest convenience. Your chosen primary care provider will be managing your medications going forward. You need to see one in person withtin the next 30 days.    Restart trazodone 50 mg at bedtime.  Hydroxyzine as needed only for insomnia.  Do not take the medications with alcohol nor when driving.    Referrals to sleep clinic has been signed. Schedulers will call you in the next 3-5 business days.

## 2023-09-05 NOTE — PROGRESS NOTES
Charles is a 33 year old who is being evaluated via a billable video visit.      How would you like to obtain your AVS? MyChart  If the video visit is dropped, the invitation should be resent by: Text to cell phone: [phone]  Will anyone else be joining your video visit? No          Assessment & Plan     Insomnia, unspecified type  Uncontrolled.  Chronic difficulty falling back to sleep after early morning awakening.  After review of meds he used, will restart prn hydroxyzine, and nightly trazodone.  Patient agreed to referral to sleep clinic.  Avoid controlled medications due to hx of substance abuse.  Return precautions discussed and given to patient.   - hydrOXYzine (VISTARIL) 50 MG capsule  Dispense: 30 capsule; Refill: 0  - traZODone (DESYREL) 50 MG tablet  Dispense: 60 tablet; Refill: 0       Patient Instructions   Establish care with a primary care provider/clinic at your soonest convenience. Your chosen primary care provider will be managing your medications going forward. You need to see one in person withtin the next 30 days.    Restart trazodone 50 mg at bedtime.  Hydroxyzine as needed only for insomnia.  Do not take the medications with alcohol nor when driving.    Referrals to sleep clinic has been signed. Schedulers will call you in the next 3-5 business days.       Nando Templeton MD  [facility]    Arlin Soto is a 33 year old, presenting for the following health issues:  Insomnia (Requesting sleep medication)        9/5/2023     7:38 AM   Additional Questions   Roomed by Karly Romo MA   Accompanied by self         9/5/2023     7:38 AM   Patient Reported Additional Medications   Patient reports taking the following new medications none       HPI     Insomnia  Onset/Duration: ongoing for a couple months.  Description:   Frequency of insomnia:  several times a week  Time to fall asleep (sleep latency): 1.5 hours  Middle of night awakening:  YES  Early morning  "awakening:  YES  Progression of Symptoms:  same  Accompanying Signs & Symptoms:  Daytime sleepiness/napping: No  Excessive snoring/apnea: No  Restless legs: No  Waking to urinate: No  Chronic pain:  No  Depression symptoms (if yes, do PHQ9): No  Anxiety symptoms (if yes, do GURU-7): YES  History:  Prior Insomnia: YES  New stressful situation: No  Precipitating factors:   Caffeine intake: YES- a 12oz pop every day  OTC decongestants: No  Any new medications: No  Alleviating factors:  Self medicating (alcohol, etc.):  No  Stress-reduction (exercise, yoga, meditation etc): YES- a little exercise.  Therapies tried and outcome: Has tried melatonin and trazodone       Per last telemedicine visit notes in chart:  \"Patient has trouble falling asleep and staying asleep. May be related to his drug use history and change in environment. Discussed good sleep habits. Short course of trazodone sent in and then he can follow up with psychiatry for anxiety and depression which may be contributing to his sleep. He will update med list talking to the group home. \"  Review of Systems   Constitutional, HEENT, cardiovascular, pulmonary, GI, , musculoskeletal, neuro, skin, endocrine and psych systems are negative, except as otherwise noted.      Objective    Vitals - Patient Reported  Pain Score: No Pain (0)        Physical Exam   GENERAL: alert and no distress  EYES: Eyes grossly normal to inspection.  No discharge or erythema, or obvious scleral/conjunctival abnormalities.  RESP: No audible wheeze, cough, or visible cyanosis.  No visible retractions or increased work of breathing.    SKIN: Visible skin clear. No significant rash, abnormal pigmentation or lesions.  NEURO: Cranial nerves grossly intact.  Mentation and speech appropriate for age.  PSYCH: Mentation appears normal, affect normal/bright, judgement and insight intact, normal speech and appearance well-groomed.    No results found for any visits on 09/05/23.        "     Video-Visit Details    Type of service:  Video Visit   Video Start Time:  8:05 am  Video End Time:8:14 AM    Originating Location (pt. Location): Home    Distant Location (provider location):  Off-site  Platform used for Video Visit: Jay

## 2023-10-07 ENCOUNTER — HEALTH MAINTENANCE LETTER (OUTPATIENT)
Age: 33
End: 2023-10-07

## 2024-01-17 ENCOUNTER — OFFICE VISIT (OUTPATIENT)
Dept: FAMILY MEDICINE | Facility: CLINIC | Age: 34
End: 2024-01-17
Payer: COMMERCIAL

## 2024-01-17 VITALS
HEART RATE: 91 BPM | BODY MASS INDEX: 22.26 KG/M2 | OXYGEN SATURATION: 99 % | TEMPERATURE: 97.4 F | WEIGHT: 168 LBS | SYSTOLIC BLOOD PRESSURE: 126 MMHG | HEIGHT: 73 IN | DIASTOLIC BLOOD PRESSURE: 82 MMHG

## 2024-01-17 DIAGNOSIS — L70.9 ACNE, UNSPECIFIED ACNE TYPE: Primary | ICD-10-CM

## 2024-01-17 DIAGNOSIS — M21.619 BUNION: ICD-10-CM

## 2024-01-17 PROCEDURE — 99213 OFFICE O/P EST LOW 20 MIN: CPT | Performed by: FAMILY MEDICINE

## 2024-01-17 RX ORDER — CLINDAMYCIN PHOSPHATE 10 MG/G
GEL TOPICAL 2 TIMES DAILY
Qty: 60 ML | Refills: 1 | Status: SHIPPED | OUTPATIENT
Start: 2024-01-17 | End: 2024-01-19

## 2024-01-17 ASSESSMENT — PAIN SCALES - GENERAL: PAINLEVEL: NO PAIN (0)

## 2024-01-17 NOTE — PROGRESS NOTES
"  Assessment & Plan     Acne, unspecified acne type  Referral done   - Adult Dermatology  Referral; Future  - clindamycin (CLINDAMAX) 1 % external gel; Apply topically 2 times daily    Bunion  Refer    - Orthopedic  Referral; Future  MED REC REQUIRED  Post Medication Reconciliation Status:     Arlin Soto is a 33 year old, presenting for the following health issues:  Referral (Patient would like referral to podiatry for foot pain. Seen in the ER a few years ago but never follow up. ) and Derm Problem (Ance on face back and shoulders x 1 years. )        1/17/2024     2:58 PM   Additional Questions   Roomed by Sania DE LA CRUZ     Chief Complaint   Patient presents with    Referral     Patient would like referral to podiatry for foot pain. Seen in the ER a few years ago but never follow up.     Derm Problem     Ance on face back and shoulders x 1 years.        Concern - Referral to podiatrist  Onset: more than 2 years x rays in epic  Description: bunion on right foot. Left foot pain   Progression of Symptoms:  same  Previous history of similar problem: yes   Precipitating factors:        Worsened by: Walking makes it worse  Alleviating factors:        Improved by: Rest makes it better   Therapies tried and outcome: None    He has had Xrays before which are normal   Pt also has acne and wanst accutane      Objective    /82   Pulse 91   Temp 97.4  F (36.3  C) (Temporal)   Ht 1.859 m (6' 1.19\")   Wt 76.2 kg (168 lb)   SpO2 99%   BMI 22.05 kg/m    Body mass index is 22.05 kg/m .  Rest of the ROS is Negative except see above and Problem list [stable]    Physical Exam   GENERAL: alert and no distress  NECK: no adenopathy, no asymmetry, masses, or scars  RESP: lungs clear to auscultation - no rales, rhonchi or wheezes  CV: regular rate and rhythm, normal S1 S2, no S3 or S4, no murmur, click or rub, no peripheral edema  Has a Bunion on his Foot  Acne back  Some scarring from acne face      "     Signed Electronically by: Nathalia Jaime MD

## 2024-01-19 ENCOUNTER — LAB (OUTPATIENT)
Dept: LAB | Facility: CLINIC | Age: 34
End: 2024-01-19
Payer: COMMERCIAL

## 2024-01-19 ENCOUNTER — OFFICE VISIT (OUTPATIENT)
Dept: DERMATOLOGY | Facility: CLINIC | Age: 34
End: 2024-01-19
Payer: COMMERCIAL

## 2024-01-19 DIAGNOSIS — Z79.899 ON ISOTRETINOIN THERAPY: Primary | ICD-10-CM

## 2024-01-19 DIAGNOSIS — L90.5 ACNE SCARRING: ICD-10-CM

## 2024-01-19 DIAGNOSIS — L81.0 POST-INFLAMMATORY HYPERPIGMENTATION: ICD-10-CM

## 2024-01-19 DIAGNOSIS — Z11.4 SCREENING FOR HIV (HUMAN IMMUNODEFICIENCY VIRUS): Primary | ICD-10-CM

## 2024-01-19 DIAGNOSIS — L70.9 ACNE, UNSPECIFIED ACNE TYPE: ICD-10-CM

## 2024-01-19 DIAGNOSIS — Z11.59 NEED FOR HEPATITIS C SCREENING TEST: ICD-10-CM

## 2024-01-19 DIAGNOSIS — Z79.899 ON ISOTRETINOIN THERAPY: ICD-10-CM

## 2024-01-19 LAB
ALBUMIN SERPL BCG-MCNC: 4.5 G/DL (ref 3.5–5.2)
ALP SERPL-CCNC: 109 U/L (ref 40–150)
ALT SERPL W P-5'-P-CCNC: 14 U/L (ref 0–70)
AST SERPL W P-5'-P-CCNC: 22 U/L (ref 0–45)
BILIRUB DIRECT SERPL-MCNC: <0.2 MG/DL (ref 0–0.3)
BILIRUB SERPL-MCNC: 0.3 MG/DL
CHOLEST SERPL-MCNC: 177 MG/DL
FASTING STATUS PATIENT QL REPORTED: ABNORMAL
HCV AB SERPL QL IA: NONREACTIVE
HDLC SERPL-MCNC: 41 MG/DL
LDLC SERPL CALC-MCNC: 115 MG/DL
NONHDLC SERPL-MCNC: 136 MG/DL
PROT SERPL-MCNC: 7.7 G/DL (ref 6.4–8.3)
TRIGL SERPL-MCNC: 106 MG/DL

## 2024-01-19 PROCEDURE — 86803 HEPATITIS C AB TEST: CPT | Performed by: FAMILY MEDICINE

## 2024-01-19 PROCEDURE — 80076 HEPATIC FUNCTION PANEL: CPT | Performed by: PATHOLOGY

## 2024-01-19 PROCEDURE — 36415 COLL VENOUS BLD VENIPUNCTURE: CPT | Performed by: PATHOLOGY

## 2024-01-19 PROCEDURE — 87389 HIV-1 AG W/HIV-1&-2 AB AG IA: CPT | Performed by: FAMILY MEDICINE

## 2024-01-19 PROCEDURE — 99000 SPECIMEN HANDLING OFFICE-LAB: CPT | Performed by: PATHOLOGY

## 2024-01-19 PROCEDURE — 80061 LIPID PANEL: CPT | Performed by: PATHOLOGY

## 2024-01-19 PROCEDURE — 99203 OFFICE O/P NEW LOW 30 MIN: CPT | Performed by: PHYSICIAN ASSISTANT

## 2024-01-19 RX ORDER — ISOTRETINOIN 40 MG/1
40 CAPSULE ORAL DAILY
Qty: 30 CAPSULE | Refills: 0 | Status: SHIPPED | OUTPATIENT
Start: 2024-01-19 | End: 2024-02-16

## 2024-01-19 ASSESSMENT — PAIN SCALES - GENERAL: PAINLEVEL: SEVERE PAIN (6)

## 2024-01-19 NOTE — PROGRESS NOTES
[facility] Dermatology Note  Encounter Date: Jan 19, 2024  Office Visit      Dermatology Problem List:  1. Acne   - Tx: isotretinoin 40mg daily (initiated 1/1924)  ____________________________________________    Assessment & Plan:  # Acne vulgaris. Face and back with significant PIH and scaring of cheeks  Discussion of the risks and side effects of isotretinoin including but not limited to mucocutaneous dryness, arthralgias, myalgias, depression, suicidal ideation, headache, blurred vision, increase in liver function tests, and increase in lipids. The iPledge program brochure was provided and the contents discussed with the patient. The patient was counseled that they cannot give blood while on isotretinoin. No personal or family history of inflammatory bowel disease or hypertriglyceridemia known to patient. Reviewed need to avoid alcohol on medication. The iPledge program consent was obtained. Patient counseled that if they wear contacts, the eyes may become too dry to tolerate. Recommend follow up with eye doctor if this occurs.   - Of note, does have hx of psychosis, but this was substance abuse related in 2021. Will monitor mood closely.   - Patient is in agreement to trial of Accutane.   - Patient was started on 40 mg today. Will increase to 80 next month.   Labs: AST/ALT & Triglycerides ordered  Total cumulative dose 0mg.   Goal dose is 150-220mg/kg for this 76. 4 kg patient.  Patient's iPledge # is [id number]  The patient will stop all other acne medications. Used cetaphil cleanser and vaseline/Aquaphor for the lips.  Procedures Performed:   None     Follow-up: 6 month(s) in-person, or earlier for new or changing lesions    Staff and scribe     Scribe Disclosure:   I, CALIXTO CHONG, am serving as a scribe; to document services personally performed by Anneliese Mcgovern PA-C -based on data collection and the provider's statements to me.     Provider Disclosure:  I agree with above History,  Review of Systems, Physical exam and Plan.  I have reviewed the content of the documentation and have edited it as needed. I have personally performed the services documented here and the documentation accurately represents those services and the decisions I have made.      Electronically signed by:    All risks, benefits and alternatives were discussed with patient.  Patient is in agreement and understands the assessment and plan.  All questions were answered.    Anneliese Mcgovern PA-C, MPAS  [facility]: Phone: [phone], Fax: [phone]  [facility]: Phone: [phone],  Fax: [phone]  [facility]: Phone: [phone], Fax: [phone]  ____________________________________________    CC: Derm Problem (Acne and hyperpigmentation- had acne whole life, hyperpigmentation for last 5 years. Recently started getting back and shoulder acne. )      Reviewed patients past medical history and pertinent chart review prior to patient's visit today.     HPI:  Mr. Charles Chapa is a 33 year old male who presents today as a new patient for acne evaluation.     Today patient reports that he has had acne his whole life. Has noticed hyperpigmentation for the past 5 years.     His has recently started to develop acne on back and shoulders.     Patient is otherwise feeling well, without additional concerns.    Labs:  Lipids, Hepatic panel ordered today    Physical Exam:  Vitals: There were no vitals taken for this visit.  SKIN: Acne exam, which includes the face, neck, upper central chest, and upper central back was performed.   - There are superifical acneiform papules with intermixed open and closed comedones  - there is Acneiform scarring is noted also  - PIH on face   - No other lesions of concern on areas examined.     Medications:  Current Outpatient Medications   Medication    clindamycin (CLINDAMAX) 1 %  external gel     No current facility-administered medications for this visit.      Past Medical/Surgical History:   Patient Active Problem List   Diagnosis    Heart murmur    Drug-induced psychotic disorder with complication (H)    Altered mental status, unspecified altered mental status type    Psychosis (H)    Avoidant-restrictive food intake disorder (ARFID)    Cannabis use disorder, severe, dependence (H)    Cleft palate    Underweight    Vitamin D insufficiency     Past Medical History:   Diagnosis Date    Substance abuse (H)

## 2024-01-19 NOTE — LETTER
1/19/2024       RE: Charles Chapa  [address] [place] [address]     Dear Colleague,    Thank you for referring your patient, Charles Chapa, to the [facility]MAT[facility] [place] at [facility]. Please see a copy of my visit note below.    [facility] Dermatology Note  Encounter Date: Jan 19, 2024  Office Visit      Dermatology Problem List:  1. Acne   - Tx: isotretinoin 40mg daily (initiated 1/1924)  ____________________________________________    Assessment & Plan:  # Acne vulgaris. Face and back with significant PIH and scaring of cheeks  Discussion of the risks and side effects of isotretinoin including but not limited to mucocutaneous dryness, arthralgias, myalgias, depression, suicidal ideation, headache, blurred vision, increase in liver function tests, and increase in lipids. The iPledge program brochure was provided and the contents discussed with the patient. The patient was counseled that they cannot give blood while on isotretinoin. No personal or family history of inflammatory bowel disease or hypertriglyceridemia known to patient. Reviewed need to avoid alcohol on medication. The iPledge program consent was obtained. Patient counseled that if they wear contacts, the eyes may become too dry to tolerate. Recommend follow up with eye doctor if this occurs.   - Of note, does have hx of psychosis, but this was substance abuse related in 2021. Will monitor mood closely.   - Patient is in agreement to trial of Accutane.   - Patient was started on 40 mg today. Will increase to 80 next month.   Labs: AST/ALT & Triglycerides ordered  Total cumulative dose 0mg.   Goal dose is 150-220mg/kg for this 76. 4 kg patient.  Patient's iPledge # is [id number]  The patient will stop all other acne medications. Used cetaphil cleanser and vaseline/Aquaphor for the lips.  Procedures Performed:   None     Follow-up: 6 month(s)  in-person, or earlier for new or changing lesions    Staff and scribe     Scribe Disclosure:   I, CALIXTO CHONG, am serving as a scribe; to document services personally performed by Anneliese Mcgovern PA-C -based on data collection and the provider's statements to me.     Provider Disclosure:  I agree with above History, Review of Systems, Physical exam and Plan.  I have reviewed the content of the documentation and have edited it as needed. I have personally performed the services documented here and the documentation accurately represents those services and the decisions I have made.      Electronically signed by:    All risks, benefits and alternatives were discussed with patient.  Patient is in agreement and understands the assessment and plan.  All questions were answered.    Anneliese Mcgovern PA-C, [facility]  [facility] [place]: Phone: [phone], Fax: [phone]  [facility]: Phone: [phone],  Fax: [phone]  [facility]: Phone: [phone], Fax: [phone]  ____________________________________________    CC: Derm Problem (Acne and hyperpigmentation- had acne whole life, hyperpigmentation for last 5 years. Recently started getting back and shoulder acne. )      Reviewed patients past medical history and pertinent chart review prior to patient's visit today.     HPI:  Mr. Charles Chapa is a 33 year old male who presents today as a new patient for acne evaluation.     Today patient reports that he has had acne his whole life. Has noticed hyperpigmentation for the past 5 years.     His has recently started to develop acne on back and shoulders.     Patient is otherwise feeling well, without additional concerns.    Labs:  Lipids, Hepatic panel ordered today    Physical Exam:  Vitals: There were no vitals taken for this visit.  SKIN: Acne exam, which includes the face, neck, upper central chest, and upper  central back was performed.   - There are superifical acneiform papules with intermixed open and closed comedones  - there is Acneiform scarring is noted also  - PIH on face   - No other lesions of concern on areas examined.     Medications:  Current Outpatient Medications   Medication    clindamycin (CLINDAMAX) 1 % external gel     No current facility-administered medications for this visit.      Past Medical/Surgical History:   Patient Active Problem List   Diagnosis    Heart murmur    Drug-induced psychotic disorder with complication (H)    Altered mental status, unspecified altered mental status type    Psychosis (H)    Avoidant-restrictive food intake disorder (ARFID)    Cannabis use disorder, severe, dependence (H)    Cleft palate    Underweight    Vitamin D insufficiency     Past Medical History:   Diagnosis Date    Substance abuse (H)

## 2024-01-19 NOTE — NURSING NOTE
Dermatology Rooming Note    Charles Chapa's goals for this visit include:   Chief Complaint   Patient presents with    Derm Problem     Acne and hyperpigmentation- had acne whole life, hyperpigmentation for last 5 years. Recently started getting back and shoulder acne.      Analy Blanco, EMT

## 2024-01-20 LAB — HIV 1+2 AB+HIV1 P24 AG SERPL QL IA: NONREACTIVE

## 2024-01-24 ENCOUNTER — TELEPHONE (OUTPATIENT)
Dept: DERMATOLOGY | Facility: CLINIC | Age: 34
End: 2024-01-24
Payer: COMMERCIAL

## 2024-01-24 NOTE — TELEPHONE ENCOUNTER
[facility] Call Center    Phone Message    May a detailed message be left on voicemail: no     Reason for Call: Pharmacy is needing Ipledge number sent to pharmacy , please call pt to discuss, thank you.     Action Taken: Message routed to:  [facility] derm    Travel Screening: Not Applicable

## 2024-02-15 ENCOUNTER — ANCILLARY PROCEDURE (OUTPATIENT)
Dept: GENERAL RADIOLOGY | Facility: CLINIC | Age: 34
End: 2024-02-15
Attending: PODIATRIST
Payer: COMMERCIAL

## 2024-02-15 ENCOUNTER — OFFICE VISIT (OUTPATIENT)
Dept: PODIATRY | Facility: CLINIC | Age: 34
End: 2024-02-15
Attending: FAMILY MEDICINE
Payer: COMMERCIAL

## 2024-02-15 VITALS — SYSTOLIC BLOOD PRESSURE: 115 MMHG | DIASTOLIC BLOOD PRESSURE: 78 MMHG | HEART RATE: 90 BPM

## 2024-02-15 DIAGNOSIS — M21.619 BUNION: ICD-10-CM

## 2024-02-15 DIAGNOSIS — M20.61 DEFORMITY OF TOE OF RIGHT FOOT: ICD-10-CM

## 2024-02-15 DIAGNOSIS — M77.8 CAPSULITIS OF FOOT, RIGHT: Primary | ICD-10-CM

## 2024-02-15 PROBLEM — F41.9 ANXIETY: Status: ACTIVE | Noted: 2020-03-09

## 2024-02-15 PROBLEM — Z76.5 DRUG-SEEKING BEHAVIOR: Status: ACTIVE | Noted: 2024-02-15

## 2024-02-15 PROCEDURE — 99243 OFF/OP CNSLTJ NEW/EST LOW 30: CPT | Performed by: PODIATRIST

## 2024-02-15 PROCEDURE — 73630 X-RAY EXAM OF FOOT: CPT | Mod: TC | Performed by: RADIOLOGY

## 2024-02-15 NOTE — LETTER
2/15/2024         RE: Charles Chapa  [address] [address]        Dear Colleague,    Thank you for referring your patient, Charles Chapa, to the [facility]. Please see a copy of my visit note below.    S:  Patient seen today in consult from Dr. Jaime and complains of right foot pain.  Points to plantar first metatarsal head.  Describes as a burning pain.  aggravated by activity and relieved by rest.  Patient states he has had this his whole life.  Working on his feet now and somewhat worse.  Also has toe deformities.  Past history of cigarette smoking.  Past history of vitamin D deficiency.  Has cannabis use disorder.  Has never done anything to try to treat problem.    ROS:   see above     No Known Allergies    Current Outpatient Medications   Medication Sig Dispense Refill     ISOtretinoin (ACCUTANE) 40 MG capsule Take 1 capsule (40 mg) by mouth daily 30 capsule 0       Patient Active Problem List   Diagnosis     Heart murmur     Drug-induced psychotic disorder with complication (H)     Altered mental status, unspecified altered mental status type     Psychosis (H)     Avoidant-restrictive food intake disorder (ARFID)     Cannabis use disorder, severe, dependence (H)     Cleft palate     Underweight     Vitamin D insufficiency     Anxiety     Drug-seeking behavior       Past Medical History:   Diagnosis Date     Substance abuse (H)        No past surgical history on file.    Family History   Problem Relation Age of Onset     Arthritis Mother      Diabetes Brother      Hypertension No family hx of      Skin Cancer No family hx of      Melanoma No family hx of        Social History     Tobacco Use     Smoking status: Former     Types: Cigarettes     Quit date: 2018     Years since quittin.7     Smokeless tobacco: Never   Substance Use Topics     Alcohol use: No         EXAM:    Vitals: /78   Pulse 90   BMI: There is no height or weight on file to  calculate BMI.  Height: Data Unavailable    Constitutional/ general:  Pt is in no apparent distress, appears well-nourished.  Cooperative with history and physical exam.     Psych:  The patient answered questions appropriately.  Normal affect.  Seems to have reasonable expectations, in terms of treatment.     Lungs:  Non labored breathing, non labored speech. No cough.  No audible wheezing. Even, quiet breathing.       Vascular:  Pedal pulses are palpable bilaterally for both the DP and PT arteries.  CFT < 3 sec.  No edema.  Pedal hair growth noted.     Neuro:  Alert and oriented x 3. Coordinated gait.  Light touch sensation is intact     Derm: Normal texture and turgor.  No erythema, ecchymosis, or cyanosis.  No open lesions.     Musculoskeletal:    Patient is ambulatory without an assistive device or brace  Cavus arch with weightbearing.  Increased internal tibial torsion on right noted compared to left.   Plantar fascia prominent and arch.  Normal range of motion of first MTPJ.  No pain with this.  Muscle compartments intact.  No pain with palpation dorsal medial first MTPJ.  Some plane plantar first MTPJ.  No pain on the second ray.  Interphalangeus noted.  We cannot reduce this.    Radiographic Exam:  X-Ray Findings:  I personally reviewed the films.  Consistent with above findings.  Right first MTPJ in good alignment    A:    Cavus foot  Right subfirst MTPJ capsulitis  Toe deformity    P:   X-rays taken today of right foot.  Explained how cavus foot can cause subfirst pain.  He is wearing a very malleable shoe.  Discussed how stiff shoe can help offload this.  Recommended stiff shoes both inside and outside and made suggestions.  We wrote a prescription for orthotics to offload first metatarsal heads.  Patient inquiring about surgery.  Would recommend conservative treatments first.  He is somewhat unhappy about this.  If patient had surgery in future would have to ensure vitamin D is normal and that he is not  smoking at all.  RTC as needed.  Thank you for allowing me participate in the care of this patient.        Kang Price DPM, FACFAS      Again, thank you for allowing me to participate in the care of your patient.        Sincerely,        Kang Price DPM

## 2024-02-15 NOTE — PATIENT INSTRUCTIONS
We wish you continued good healing. If you have any questions or concerns, please do not hesitate to contact us at  [phone]    [facility] (secure e-mail communication and access to your chart) to send a message or to make an appointment.    Please remember to call and schedule a follow up appointment if one was recommended at your earliest convenience.     PODIATRY CLINIC HOURS  TELEPHONE NUMBER    Dr. Kang RICHPKAYLI FACFAS        Clinics:  [facility]   [place]  Tuesday 1PM-6PM  [place]  Wednesday 745AM-330PM  [place]  Monday 2nd,4th  830AM-4PM  Thursday/Friday 745AM-230PM     [place] APPOINTMENTS  [phone]    [place] APPOINTMENTS  [phone]          If you need a medication refill, please contact us you may need lab work and/or a follow up visit prior to your refill (i.e. Antifungal medications).  If MRI needed please call Imaging at [phone]   HOW DO I GET MY KNEE SCOOTER? Knee scooters can be picked up at ANY Medical Supply stores with your knee scooter Prescription.  OR  Bring your signed prescription to an [facility] Medical [facility] showroom.   Set up an appointment for your custom Orthotics. Call any Orthotics locations call [phone]

## 2024-02-15 NOTE — PROGRESS NOTES
S:  Patient seen today in consult from Dr. Jaime and complains of right foot pain.  Points to plantar first metatarsal head.  Describes as a burning pain.  aggravated by activity and relieved by rest.  Patient states he has had this his whole life.  Working on his feet now and somewhat worse.  Also has toe deformities.  Past history of cigarette smoking.  Past history of vitamin D deficiency.  Has cannabis use disorder.  Has never done anything to try to treat problem.    ROS:   see above     No Known Allergies    Current Outpatient Medications   Medication Sig Dispense Refill    ISOtretinoin (ACCUTANE) 40 MG capsule Take 1 capsule (40 mg) by mouth daily 30 capsule 0       Patient Active Problem List   Diagnosis    Heart murmur    Drug-induced psychotic disorder with complication (H)    Altered mental status, unspecified altered mental status type    Psychosis (H)    Avoidant-restrictive food intake disorder (ARFID)    Cannabis use disorder, severe, dependence (H)    Cleft palate    Underweight    Vitamin D insufficiency    Anxiety    Drug-seeking behavior       Past Medical History:   Diagnosis Date    Substance abuse (H)        No past surgical history on file.    Family History   Problem Relation Age of Onset    Arthritis Mother     Diabetes Brother     Hypertension No family hx of     Skin Cancer No family hx of     Melanoma No family hx of        Social History     Tobacco Use    Smoking status: Former     Types: Cigarettes     Quit date: 2018     Years since quittin.7    Smokeless tobacco: Never   Substance Use Topics    Alcohol use: No         EXAM:    Vitals: /78   Pulse 90   BMI: There is no height or weight on file to calculate BMI.  Height: Data Unavailable    Constitutional/ general:  Pt is in no apparent distress, appears well-nourished.  Cooperative with history and physical exam.     Psych:  The patient answered questions appropriately.  Normal affect.  Seems to have reasonable  expectations, in terms of treatment.     Lungs:  Non labored breathing, non labored speech. No cough.  No audible wheezing. Even, quiet breathing.       Vascular:  Pedal pulses are palpable bilaterally for both the DP and PT arteries.  CFT < 3 sec.  No edema.  Pedal hair growth noted.     Neuro:  Alert and oriented x 3. Coordinated gait.  Light touch sensation is intact     Derm: Normal texture and turgor.  No erythema, ecchymosis, or cyanosis.  No open lesions.     Musculoskeletal:    Patient is ambulatory without an assistive device or brace  Cavus arch with weightbearing.  Increased internal tibial torsion on right noted compared to left.   Plantar fascia prominent and arch.  Normal range of motion of first MTPJ.  No pain with this.  Muscle compartments intact.  No pain with palpation dorsal medial first MTPJ.  Some plane plantar first MTPJ.  No pain on the second ray.  Interphalangeus noted.  We cannot reduce this.    Radiographic Exam:  X-Ray Findings:  I personally reviewed the films.  Consistent with above findings.  Right first MTPJ in good alignment    A:    Cavus foot  Right subfirst MTPJ capsulitis  Toe deformity    P:   X-rays taken today of right foot.  Explained how cavus foot can cause subfirst pain.  He is wearing a very malleable shoe.  Discussed how stiff shoe can help offload this.  Recommended stiff shoes both inside and outside and made suggestions.  We wrote a prescription for orthotics to offload first metatarsal heads.  Patient inquiring about surgery.  Would recommend conservative treatments first.  He is somewhat unhappy about this.  If patient had surgery in future would have to ensure vitamin D is normal and that he is not smoking at all.  RTC as needed.  Thank you for allowing me participate in the care of this patient.        Kang Price, TU, FACFAS

## 2024-02-16 ENCOUNTER — VIRTUAL VISIT (OUTPATIENT)
Dept: DERMATOLOGY | Facility: CLINIC | Age: 34
End: 2024-02-16
Payer: COMMERCIAL

## 2024-02-16 DIAGNOSIS — L70.9 ACNE, UNSPECIFIED ACNE TYPE: Primary | ICD-10-CM

## 2024-02-16 DIAGNOSIS — Z79.899 ON ISOTRETINOIN THERAPY: ICD-10-CM

## 2024-02-16 PROCEDURE — 99214 OFFICE O/P EST MOD 30 MIN: CPT | Performed by: PHYSICIAN ASSISTANT

## 2024-02-16 RX ORDER — ISOTRETINOIN 40 MG/1
80 CAPSULE ORAL DAILY
Qty: 60 CAPSULE | Refills: 0 | Status: SHIPPED | OUTPATIENT
Start: 2024-02-16 | End: 2024-03-15

## 2024-02-16 NOTE — NURSING NOTE
Dermatology Rooming Note    Charles Chapa's goals for this visit include:   Chief Complaint   Patient presents with    Derm Problem     Still getting acne, dry lips side effect. Has questions about chemical peel for hyperpigmentation.     Analy Blanco, EMT

## 2024-02-16 NOTE — PROGRESS NOTES
[facility] Dermatology Note  Encounter Date: Feb 16, 2024  Office Visit      Dermatology Problem List:  1. Acne   - Tx: isotretinoin 80 mg daily (initiated 2/16/24)  ____________________________________________    Assessment & Plan:  # Acne vulgaris. Face and back with significant PIH and scaring of cheeks. End of month #1.  Discussion of the risks and side effects of isotretinoin including but not limited to mucocutaneous dryness, arthralgias, myalgias, depression, suicidal ideation, headache, blurred vision, increase in liver function tests, and increase in lipids. The iPledge program brochure was provided and the contents discussed with the patient. The patient was counseled that they cannot give blood while on isotretinoin. No personal or family history of inflammatory bowel disease or hypertriglyceridemia known to patient. Reviewed need to avoid alcohol on medication. The iPledge program consent was obtained. Patient counseled that if they wear contacts, the eyes may become too dry to tolerate. Recommend follow up with eye doctor if this occurs.   - Of note, does have hx of psychosis, but this was substance abuse related in 2021. Will monitor mood closely. Mood has been stable.   - Patient was continued on isotretinoin, increased dose to 80 mg QD   Labs: AST/ALT & Triglycerides ordered, reviewed baseline - wnl  Total cumulative dose 1200mg (25.8mg/kg).   Goal dose is 150-220mg/kg for this 76.4 kg patient.  Patient's iPledge # is [id number]  The patient will stop all other acne medications.   - Recommended Use of cetaphil cleanser and vaseline/Aquaphor for the lips. Sunscreen samples provided.  Procedures Performed:   None     Follow-up: 1 month(s) in-person, or earlier for new or changing lesions    Staff and scribe     Scribe Disclosure:   CALIXTO BILLINGSLEY, am serving as a scribe; to document services personally performed by Anneliese Mcgovern PA-C -based on data collection and the  provider's statements to me.     Provider Disclosure:  I agree with above History, Review of Systems, Physical exam and Plan.  I have reviewed the content of the documentation and have edited it as needed. I have personally performed the services documented here and the documentation accurately represents those services and the decisions I have made.      Electronically signed by:    All risks, benefits and alternatives were discussed with patient.  Patient is in agreement and understands the assessment and plan.  All questions were answered.    Anneliese Mcgovern PA-C, MPAS  [facility] [place]: Phone: [phone], Fax: [phone]  [facility]: Phone: [phone],  Fax: [phone]  [facility]: Phone: [phone], Fax: [phone]  ____________________________________________    CC: Derm Problem (Still getting acne, dry lips side effect. Has questions about chemical peel for hyperpigmentation.)      Reviewed patients past medical history and pertinent chart review prior to patient's visit today.     HPI:  Mr. Charles Chapa is a 33 year old male who presents today as a new patient for acne evaluation.     Today patient reports that he is still experiencing acne. Has noted that his lips are also really dry.     Patient is otherwise feeling well, without additional concerns.    Labs:  - Lipids, AST/ALT ordered today  - reviewed labs from 1/19/24 - hepatic panel, lipids - wnl    Physical Exam:  Vitals: There were no vitals taken for this visit.  SKIN: Acne exam, which includes the face, neck, upper central chest, and upper central back was performed.   - There are superifical acneiform papules with intermixed open and closed comedones  - there is Acneiform scarring is noted also  - PIH on face   - No other lesions of concern on areas examined.     Medications:  Current Outpatient Medications   Medication     ISOtretinoin (ACCUTANE) 40 MG capsule     No current facility-administered medications for this visit.      Past Medical/Surgical History:   Patient Active Problem List   Diagnosis    Heart murmur    Drug-induced psychotic disorder with complication (H)    Altered mental status, unspecified altered mental status type    Psychosis (H)    Avoidant-restrictive food intake disorder (ARFID)    Cannabis use disorder, severe, dependence (H)    Cleft palate    Underweight    Vitamin D insufficiency    Anxiety    Drug-seeking behavior    Acne, unspecified acne type     Past Medical History:   Diagnosis Date    Substance abuse (H)

## 2024-02-16 NOTE — PATIENT INSTRUCTIONS
Recommended Sunscreens:                   Accutane FAQs    DO NOT take if you are pregnant  DO NOT get pregnant before starting isotretinoin , while taking it, or 1 month after stopping the last dose  There WILL be a 30 day waiting period where you MUST be on 2 forms of birth control  You WILL need 2 negative pregnancy tests that are 30 days exactly, or more, apart.  The 1st test is done when you decide you want to decide to take isotretinoin  30 days or more later, the 2nd will need to be done within the first 5 days of your menstrual cycle  You will NEED to  your prescription within 7 days of your office visit with the provider.    You MUST be seen every 30 days with a pregnancy test  After every pregnancy test, your provider will need to confirm you in iPLEDGE.  Before you are able to  your prescription, you will need to log on to iPLEDGE and answer questions  If you miss your 7 day window for your first prescription, you will need to have a pregnancy test, then 19 days later another one will have to performed and then you will be able to get your prescription    Moisturizer recommendations:

## 2024-02-22 ENCOUNTER — MYC MEDICAL ADVICE (OUTPATIENT)
Dept: DERMATOLOGY | Facility: CLINIC | Age: 34
End: 2024-02-22

## 2024-02-23 ENCOUNTER — LAB (OUTPATIENT)
Dept: LAB | Facility: CLINIC | Age: 34
End: 2024-02-23
Payer: COMMERCIAL

## 2024-02-23 DIAGNOSIS — Z79.899 ON ISOTRETINOIN THERAPY: ICD-10-CM

## 2024-02-23 LAB
ALT SERPL W P-5'-P-CCNC: 9 U/L (ref 0–70)
AST SERPL W P-5'-P-CCNC: 27 U/L (ref 0–45)
CHOLEST SERPL-MCNC: 165 MG/DL
FASTING STATUS PATIENT QL REPORTED: NO
HDLC SERPL-MCNC: 40 MG/DL
LDLC SERPL CALC-MCNC: 109 MG/DL
NONHDLC SERPL-MCNC: 125 MG/DL
TRIGL SERPL-MCNC: 81 MG/DL

## 2024-02-23 PROCEDURE — 84460 ALANINE AMINO (ALT) (SGPT): CPT | Performed by: PATHOLOGY

## 2024-02-23 PROCEDURE — 80061 LIPID PANEL: CPT | Performed by: PATHOLOGY

## 2024-02-23 PROCEDURE — 84450 TRANSFERASE (AST) (SGOT): CPT | Performed by: PATHOLOGY

## 2024-02-23 PROCEDURE — 36415 COLL VENOUS BLD VENIPUNCTURE: CPT | Performed by: PATHOLOGY

## 2024-02-26 NOTE — TELEPHONE ENCOUNTER
2/16/2024 Assessment & Plan:  # Acne vulgaris. Face and back with significant PIH and scaring of cheeks. End of month #1.  Discussion of the risks and side effects of isotretinoin including but not limited to mucocutaneous dryness, arthralgias, myalgias, depression, suicidal ideation, headache, blurred vision, increase in liver function tests, and increase in lipids. The iPledge program brochure was provided and the contents discussed with the patient. The patient was counseled that they cannot give blood while on isotretinoin. No personal or family history of inflammatory bowel disease or hypertriglyceridemia known to patient. Reviewed need to avoid alcohol on medication. The iPledge program consent was obtained. Patient counseled that if they wear contacts, the eyes may become too dry to tolerate. Recommend follow up with eye doctor if this occurs.   - Of note, does have hx of psychosis, but this was substance abuse related in 2021. Will monitor mood closely. Mood has been stable.   - Patient was continued on isotretinoin, increased dose to 80 mg QD   Labs: AST/ALT & Triglycerides ordered, reviewed baseline - wnl  Total cumulative dose 1200mg (25.8mg/kg).   Goal dose is 150-220mg/kg for this 76.4 kg patient.  Patient's iPledge # is [id number]  The patient will stop all other acne medications.   - Recommended Use of cetaphil cleanser and vaseline/Aquaphor for the lips. Sunscreen samples provided.

## 2024-02-26 NOTE — TELEPHONE ENCOUNTER
Patient confirmed in Ipledge. Prescription already sent to pharmacy and provider advised pt to continue medication during visit 2/16. No record of patient filling medication since 2/16 visit, on ipledge.    Confirm Patient Counseling is Complete.  The patient's REMS Status is Qualified to Receive Drug.  The patient may obtain their prescription at the pharmacy. The patient must obtain the prescription before 11:59 PM Eastern Time on 3/26/2024.    Micah Marley, EMT

## 2024-03-15 ENCOUNTER — VIRTUAL VISIT (OUTPATIENT)
Dept: DERMATOLOGY | Facility: CLINIC | Age: 34
End: 2024-03-15
Payer: COMMERCIAL

## 2024-03-15 DIAGNOSIS — Z79.899 ON ISOTRETINOIN THERAPY: ICD-10-CM

## 2024-03-15 PROCEDURE — 99213 OFFICE O/P EST LOW 20 MIN: CPT | Mod: 93 | Performed by: PHYSICIAN ASSISTANT

## 2024-03-15 RX ORDER — ISOTRETINOIN 40 MG/1
80 CAPSULE ORAL DAILY
Qty: 60 CAPSULE | Refills: 0 | Status: SHIPPED | OUTPATIENT
Start: 2024-03-15

## 2024-03-15 ASSESSMENT — PAIN SCALES - GENERAL: PAINLEVEL: NO PAIN (0)

## 2024-03-15 NOTE — PROGRESS NOTES
[facility] Dermatology Note  Encounter Date: Mar 15, 2024  Virtual visit/[facility] - Clinic and Surgery [place], [place], [place] - phone # [phone]. Length of call: 6min    Dermatology Problem List:  1. Acne   - Tx: isotretinoin 80 mg daily (initiated 2/16/24)  ____________________________________________    Assessment & Plan:  # Acne vulgaris. Face and back with significant PIH and scaring of cheeks. End of month #2.  Edu on dry skin, tolerating well.   - Of note, does have hx of psychosis, but this was substance abuse related in 2021. Will monitor mood closely. Mood has been stable.    At this visit, we will continue isotretinoin 80mg daily. One month supply with no refills provided.   Labs: AST/ALT & Triglycerides ordered, reviewed last months labs and they are wnl  Total cumulative dose 3600mg (47.1mg/kg).   Goal dose is 150-220mg/kg for this 76.4kg patient.  Patient's iPledge # is [id number] .   The patient will stop all other acne medications. Used cetaphil cleanser and vaseline/Aquaphor for the lips.  Procedures Performed:   None     Follow-up: 1 month(s) in-person, or earlier for new or changing lesions    Staff and scribe     Scribe Disclosure:   I, CALIXTO CHONG, am serving as a scribe; to document services personally performed by Anneliese Mcgovern PA-C -based on data collection and the provider's statements to me.     Provider Disclosure:  I agree with above History, Review of Systems, Physical exam and Plan.  I have reviewed the content of the documentation and have edited it as needed. I have personally performed the services documented here and the documentation accurately represents those services and the decisions I have made.      Electronically signed by:    All risks, benefits and alternatives were discussed with patient.  Patient is in agreement and understands the assessment and plan.  All questions were answered.    Anneliese Mcgovern PA-C, MPAS  [facility]  [facility]: Phone: [phone], Fax: [phone]  [facility] [place]: Phone: [phone],  Fax: [phone]  [facility] [place]: Phone: [phone], Fax: [phone]  ____________________________________________    CC: Derm Problem (Acne- dry lips. Stable)      Reviewed patients past medical history and pertinent chart review prior to patient's visit today.     HPI:  Mr. Charles Chapa is a 33 year old male who presents today as a new patient for acne evaluation.     Today patient reports that he is still experiencing acne. Has noted that his lips are also really dry. Using Vaseline which is helping. End of month #2. The patient reports tolerable mucocutaneous dryness, and denies arthralgias, myalgias, depression, suicidal ideation, diarrhea, headache, or blurred vision.      Patient is otherwise feeling well, without additional concerns.    Labs:  - Lipids, AST/ALT ordered today  - reviewed labs from 1/19/24 - hepatic panel, lipids - wnl    Physical Exam:  Vitals: There were no vitals taken for this visit.  SKIN: Acne exam, which includes the face, neck, upper central chest, and upper central back was performed.   - There are superifical acneiform papules with intermixed open and closed comedones  - there is Acneiform scarring is noted also  - PIH on face   - No other lesions of concern on areas examined.     Medications:  Current Outpatient Medications   Medication    ISOtretinoin (ACCUTANE) 40 MG capsule     No current facility-administered medications for this visit.      Past Medical/Surgical History:   Patient Active Problem List   Diagnosis    Heart murmur    Drug-induced psychotic disorder with complication (H)    Altered mental status, unspecified altered mental status type    Psychosis (H)    Avoidant-restrictive food intake disorder (ARFID)    Cannabis use disorder, severe, dependence (H)    Cleft palate     Underweight    Vitamin D insufficiency    Anxiety    Drug-seeking behavior    Acne, unspecified acne type     Past Medical History:   Diagnosis Date    Substance abuse (H)

## 2024-03-15 NOTE — LETTER
3/15/2024       RE: Charles Chapa  [address] [place] [address]     Dear Colleague,    Thank you for referring your patient, Charles Chapa, to the [facility] DERMATOLOGY [facility]IC [place] at [facility]. Please see a copy of my visit note below.    [facility] Dermatology Note  Encounter Date: Mar 15, 2024  Virtual visit/[facility] - [facility] and [facility], [place], [place] - phone # [phone]. Length of call: 6min    Dermatology Problem List:  1. Acne   - Tx: isotretinoin 80 mg daily (initiated 2/16/24)  ____________________________________________    Assessment & Plan:  # Acne vulgaris. Face and back with significant PIH and scaring of cheeks. End of month #2.  Edu on dry skin, tolerating well.   - Of note, does have hx of psychosis, but this was substance abuse related in 2021. Will monitor mood closely. Mood has been stable.    At this visit, we will continue isotretinoin 80mg daily. One month supply with no refills provided.   Labs: AST/ALT & Triglycerides ordered, reviewed last months labs and they are wnl  Total cumulative dose 3600mg (47.1mg/kg).   Goal dose is 150-220mg/kg for this 76.4kg patient.  Patient's iPledge # is [id number] .   The patient will stop all other acne medications. Used cetaphil cleanser and vaseline/Aquaphor for the lips.  Procedures Performed:   None     Follow-up: 1 month(s) in-person, or earlier for new or changing lesions    Staff and scribe     Scribe Disclosure:   I, CALIXTO CHONG, am serving as a scribe; to document services personally performed by Anneliese Mcgovern PA-C -based on data collection and the provider's statements to me.     Provider Disclosure:  I agree with above History, Review of Systems, Physical exam and Plan.  I have reviewed the content of the documentation and have edited it as needed. I have personally performed the services documented here and the  documentation accurately represents those services and the decisions I have made.      Electronically signed by:    All risks, benefits and alternatives were discussed with patient.  Patient is in agreement and understands the assessment and plan.  All questions were answered.    Anneliese Mcgovern PA-C, MPAS  [facility]: Phone: [phone], Fax: [phone]  [facility]: Phone: [phone],  Fax: [phone]  [facility]: Phone: [phone], Fax: [phone]  ____________________________________________    CC: Derm Problem (Acne- dry lips. Stable)      Reviewed patients past medical history and pertinent chart review prior to patient's visit today.     HPI:  Mr. Charles Chapa is a 33 year old male who presents today as a new patient for acne evaluation.     Today patient reports that he is still experiencing acne. Has noted that his lips are also really dry. Using Vaseline which is helping. End of month #2. The patient reports tolerable mucocutaneous dryness, and denies arthralgias, myalgias, depression, suicidal ideation, diarrhea, headache, or blurred vision.      Patient is otherwise feeling well, without additional concerns.    Labs:  - Lipids, AST/ALT ordered today  - reviewed labs from 1/19/24 - hepatic panel, lipids - wnl    Physical Exam:  Vitals: There were no vitals taken for this visit.  SKIN: Acne exam, which includes the face, neck, upper central chest, and upper central back was performed.   - There are superifical acneiform papules with intermixed open and closed comedones  - there is Acneiform scarring is noted also  - PIH on face   - No other lesions of concern on areas examined.     Medications:  Current Outpatient Medications   Medication    ISOtretinoin (ACCUTANE) 40 MG capsule     No current facility-administered medications for this visit.      Past Medical/Surgical History:    Patient Active Problem List   Diagnosis    Heart murmur    Drug-induced psychotic disorder with complication (H)    Altered mental status, unspecified altered mental status type    Psychosis (H)    Avoidant-restrictive food intake disorder (ARFID)    Cannabis use disorder, severe, dependence (H)    Cleft palate    Underweight    Vitamin D insufficiency    Anxiety    Drug-seeking behavior    Acne, unspecified acne type     Past Medical History:   Diagnosis Date    Substance abuse (H)

## 2024-03-15 NOTE — NURSING NOTE
Dermatology Rooming Note    Charles Chapa's goals for this visit include:   Chief Complaint   Patient presents with    Derm Problem     Acne- dry lips. Stable     Analy Blanco, EMT

## 2024-03-19 ENCOUNTER — LAB (OUTPATIENT)
Dept: LAB | Facility: CLINIC | Age: 34
End: 2024-03-19
Payer: COMMERCIAL

## 2024-03-19 DIAGNOSIS — Z79.899 ON ISOTRETINOIN THERAPY: ICD-10-CM

## 2024-03-19 PROCEDURE — 36415 COLL VENOUS BLD VENIPUNCTURE: CPT

## 2024-03-19 PROCEDURE — 80061 LIPID PANEL: CPT

## 2024-03-19 PROCEDURE — 84460 ALANINE AMINO (ALT) (SGPT): CPT

## 2024-03-19 PROCEDURE — 84450 TRANSFERASE (AST) (SGOT): CPT

## 2024-03-20 LAB
ALT SERPL W P-5'-P-CCNC: 13 U/L (ref 0–70)
AST SERPL W P-5'-P-CCNC: 23 U/L (ref 0–45)
CHOLEST SERPL-MCNC: 211 MG/DL
FASTING STATUS PATIENT QL REPORTED: ABNORMAL
HDLC SERPL-MCNC: 41 MG/DL
LDLC SERPL CALC-MCNC: 147 MG/DL
NONHDLC SERPL-MCNC: 170 MG/DL
TRIGL SERPL-MCNC: 116 MG/DL

## 2024-03-27 ENCOUNTER — OFFICE VISIT (OUTPATIENT)
Dept: FAMILY MEDICINE | Facility: CLINIC | Age: 34
End: 2024-03-27
Payer: COMMERCIAL

## 2024-03-27 VITALS
OXYGEN SATURATION: 100 % | BODY MASS INDEX: 22.44 KG/M2 | SYSTOLIC BLOOD PRESSURE: 123 MMHG | DIASTOLIC BLOOD PRESSURE: 83 MMHG | TEMPERATURE: 97 F | WEIGHT: 171 LBS | HEART RATE: 78 BPM | RESPIRATION RATE: 18 BRPM

## 2024-03-27 DIAGNOSIS — J30.9 ALLERGIC RHINITIS, UNSPECIFIED SEASONALITY, UNSPECIFIED TRIGGER: Primary | ICD-10-CM

## 2024-03-27 PROCEDURE — 99213 OFFICE O/P EST LOW 20 MIN: CPT | Performed by: FAMILY MEDICINE

## 2024-03-27 RX ORDER — OLOPATADINE HYDROCHLORIDE 1 MG/ML
1 SOLUTION/ DROPS OPHTHALMIC 2 TIMES DAILY
Qty: 5 ML | Refills: 0 | Status: SHIPPED | OUTPATIENT
Start: 2024-03-27

## 2024-03-27 RX ORDER — CETIRIZINE HYDROCHLORIDE 10 MG/1
10 TABLET ORAL DAILY
Qty: 30 TABLET | Refills: 0 | Status: SHIPPED | OUTPATIENT
Start: 2024-03-27

## 2024-03-27 RX ORDER — FLUTICASONE PROPIONATE 50 MCG
2 SPRAY, SUSPENSION (ML) NASAL DAILY
Qty: 9 ML | Refills: 0 | Status: SHIPPED | OUTPATIENT
Start: 2024-03-27 | End: 2024-04-24

## 2024-03-27 ASSESSMENT — ENCOUNTER SYMPTOMS: WHEEZING: 1

## 2024-03-27 ASSESSMENT — PAIN SCALES - GENERAL: PAINLEVEL: SEVERE PAIN (6)

## 2024-03-27 NOTE — PROGRESS NOTES
Assessment & Plan     Allergic rhinitis, unspecified seasonality, unspecified trigger  Advised   - cetirizine (ZYRTEC) 10 MG tablet; Take 1 tablet (10 mg) by mouth daily  - fluticasone (FLONASE) 50 MCG/ACT nasal spray; Spray 2 sprays into both nostrils daily  - olopatadine (PATANOL) 0.1 % ophthalmic solution; Place 1 drop into both eyes 2 times daily  Follow up if not better  Subjective   Charles is a 33 year old, presenting for the following health issues:  Allergies (Nasal congestion and eye redness would like allergy testing. )        3/27/2024     3:37 PM   Additional Questions   Roomed by Sania     Allergies    History of Present Illness       Reason for visit:  Pain in my left eye, sinus infection, Nasal septum deviation.  Symptom onset:  More than a month  Symptoms include:  Blocked nostrils, nosebleeds and noisy breathing during sleep. There also can be face pain.  Symptom intensity:  Severe  Symptom progression:  Worsening  Had these symptoms before:  Yes  Has tried/received treatment for these symptoms:  No  What makes it worse:  Waking up  What makes it better:  Sleeping    He eats 2-3 servings of fruits and vegetables daily.He consumes 2 sweetened beverage(s) daily.He exercises with enough effort to increase his heart rate 10 to 19 minutes per day.  He exercises with enough effort to increase his heart rate 3 or less days per week.   He is taking medications regularly.   Pt has had surgery for cleft palate  Says Eyes Itch, runny nose  Wondering if he has allergies  On accutane  for acne   No known asthma              Rest of the ROS is Negative except see above and Problem list [stable]        Objective    /83   Pulse 78   Temp 97  F (36.1  C) (Temporal)   Resp 18   Wt 77.6 kg (171 lb)   SpO2 100%   BMI 22.44 kg/m    Body mass index is 22.44 kg/m .  Physical Exam   GENERAL: alert and no distress  EYES: Eyes grossly normal to inspection, PERRL and conjunctivae and sclerae normal  NECK: no  adenopathy, no asymmetry, masses, or scars  RESP: lungs clear to auscultation - no rales, rhonchi or wheezes  CV: regular rate and rhythm, normal S1 S2, no S3 or S4, no murmur, click or rub, no peripheral edema  PSYCH: anxious            Signed Electronically by: Nathalia Jaime MD

## 2024-04-22 NOTE — LETTER
2/16/2024       RE: Charles Chapa  [address] [place] [address]     Dear Colleague,    Thank you for referring your patient, Charles Chapa, to the [facility] DERMATOLOGY CLINIC [place] at [facility]. Please see a copy of my visit note below.    [facility] Dermatology Note  Encounter Date: Feb 16, 2024  Office Visit      Dermatology Problem List:  1. Acne   - Tx: isotretinoin 80 mg daily (initiated 2/16/24)  ____________________________________________    Assessment & Plan:  # Acne vulgaris. Face and back with significant PIH and scaring of cheeks. End of month #1.  Discussion of the risks and side effects of isotretinoin including but not limited to mucocutaneous dryness, arthralgias, myalgias, depression, suicidal ideation, headache, blurred vision, increase in liver function tests, and increase in lipids. The iPledge program brochure was provided and the contents discussed with the patient. The patient was counseled that they cannot give blood while on isotretinoin. No personal or family history of inflammatory bowel disease or hypertriglyceridemia known to patient. Reviewed need to avoid alcohol on medication. The iPledge program consent was obtained. Patient counseled that if they wear contacts, the eyes may become too dry to tolerate. Recommend follow up with eye doctor if this occurs.   - Of note, does have hx of psychosis, but this was substance abuse related in 2021. Will monitor mood closely. Mood has been stable.   - Patient was continued on isotretinoin, increased dose to 80 mg QD   Labs: AST/ALT & Triglycerides ordered, reviewed baseline - wnl  Total cumulative dose 1200mg (25.8mg/kg).   Goal dose is 150-220mg/kg for this 76.4 kg patient.  Patient's iPledge # is [id number]  The patient will stop all other acne medications.   - Recommended Use of cetaphil cleanser and vaseline/Aquaphor for the lips. Sunscreen  Palma called with a couple of questions regarding her surgery. She is wondering how the nuclear medicine part works and whether or not she will be \"put in a tube.\" She states she is claustrophobic. She was also questioning the timing of everything and whether or not her support person should wait for her or leave and come back. Writer was able to explain the event sequence to her and she verbalized understanding.     Writer sent message to Jaimie Rodriges [employer] tech who states she will send a message to the nuc med team at the hospital as she is not familiar with that part of the process. Writer asked that they call Palma and explain the process to her so she knows what to expect. She will need to be called at 0800 tomorrow since she works from 0830 to 0500.    samples provided.  Procedures Performed:   None     Follow-up: 1 month(s) in-person, or earlier for new or changing lesions    Staff and scribe     Scribe Disclosure:   I, CALIXTO CHONG, am serving as a scribe; to document services personally performed by Anneliese Mcgovern PA-C -based on data collection and the provider's statements to me.     Provider Disclosure:  I agree with above History, Review of Systems, Physical exam and Plan.  I have reviewed the content of the documentation and have edited it as needed. I have personally performed the services documented here and the documentation accurately represents those services and the decisions I have made.      Electronically signed by:    All risks, benefits and alternatives were discussed with patient.  Patient is in agreement and understands the assessment and plan.  All questions were answered.    Anneliese Mcgovern PA-C, [facility]  [facility] [place]: Phone: [phone], Fax: [phone]  [facility]: Phone: [phone],  Fax: [phone]  [facility]: Phone: [phone], Fax: [phone]  ____________________________________________    CC: Derm Problem (Still getting acne, dry lips side effect. Has questions about chemical peel for hyperpigmentation.)      Reviewed patients past medical history and pertinent chart review prior to patient's visit today.     HPI:  Mr. Charles Chapa is a 33 year old male who presents today as a new patient for acne evaluation.     Today patient reports that he is still experiencing acne. Has noted that his lips are also really dry.     Patient is otherwise feeling well, without additional concerns.    Labs:  - Lipids, AST/ALT ordered today  - reviewed labs from 1/19/24 - hepatic panel, lipids - wnl    Physical Exam:  Vitals: There were no vitals taken for this visit.  SKIN: Acne exam, which includes the face, neck, upper central  chest, and upper central back was performed.   - There are superifical acneiform papules with intermixed open and closed comedones  - there is Acneiform scarring is noted also  - PIH on face   - No other lesions of concern on areas examined.     Medications:  Current Outpatient Medications   Medication    ISOtretinoin (ACCUTANE) 40 MG capsule     No current facility-administered medications for this visit.      Past Medical/Surgical History:   Patient Active Problem List   Diagnosis    Heart murmur    Drug-induced psychotic disorder with complication (H)    Altered mental status, unspecified altered mental status type    Psychosis (H)    Avoidant-restrictive food intake disorder (ARFID)    Cannabis use disorder, severe, dependence (H)    Cleft palate    Underweight    Vitamin D insufficiency    Anxiety    Drug-seeking behavior    Acne, unspecified acne type     Past Medical History:   Diagnosis Date    Substance abuse (H)

## 2024-04-24 DIAGNOSIS — J30.9 ALLERGIC RHINITIS, UNSPECIFIED SEASONALITY, UNSPECIFIED TRIGGER: ICD-10-CM

## 2024-04-24 RX ORDER — FLUTICASONE PROPIONATE 50 MCG
2 SPRAY, SUSPENSION (ML) NASAL DAILY
Qty: 16 G | Refills: 0 | Status: SHIPPED | OUTPATIENT
Start: 2024-04-24

## 2024-11-30 ENCOUNTER — HEALTH MAINTENANCE LETTER (OUTPATIENT)
Age: 34
End: 2024-11-30

## 2025-04-22 ENCOUNTER — OFFICE VISIT (OUTPATIENT)
Dept: FAMILY MEDICINE | Facility: CLINIC | Age: 35
End: 2025-04-22
Payer: COMMERCIAL

## 2025-04-22 VITALS
HEART RATE: 76 BPM | WEIGHT: 147.2 LBS | OXYGEN SATURATION: 99 % | HEIGHT: 73 IN | BODY MASS INDEX: 19.51 KG/M2 | RESPIRATION RATE: 20 BRPM | DIASTOLIC BLOOD PRESSURE: 84 MMHG | TEMPERATURE: 97 F | SYSTOLIC BLOOD PRESSURE: 131 MMHG

## 2025-04-22 DIAGNOSIS — H10.33 ACUTE CONJUNCTIVITIS OF BOTH EYES, UNSPECIFIED ACUTE CONJUNCTIVITIS TYPE: ICD-10-CM

## 2025-04-22 DIAGNOSIS — J30.9 ALLERGIC RHINITIS, UNSPECIFIED SEASONALITY, UNSPECIFIED TRIGGER: ICD-10-CM

## 2025-04-22 DIAGNOSIS — M21.619 BUNION: ICD-10-CM

## 2025-04-22 DIAGNOSIS — J34.2 DEVIATED NASAL SEPTUM: ICD-10-CM

## 2025-04-22 DIAGNOSIS — J31.0 CHRONIC RHINITIS: Primary | ICD-10-CM

## 2025-04-22 PROCEDURE — 82785 ASSAY OF IGE: CPT | Performed by: NURSE PRACTITIONER

## 2025-04-22 PROCEDURE — 36415 COLL VENOUS BLD VENIPUNCTURE: CPT | Performed by: NURSE PRACTITIONER

## 2025-04-22 PROCEDURE — 3075F SYST BP GE 130 - 139MM HG: CPT | Performed by: NURSE PRACTITIONER

## 2025-04-22 PROCEDURE — 86003 ALLG SPEC IGE CRUDE XTRC EA: CPT | Performed by: NURSE PRACTITIONER

## 2025-04-22 PROCEDURE — 3079F DIAST BP 80-89 MM HG: CPT | Performed by: NURSE PRACTITIONER

## 2025-04-22 PROCEDURE — 99213 OFFICE O/P EST LOW 20 MIN: CPT | Performed by: NURSE PRACTITIONER

## 2025-04-22 PROCEDURE — 1126F AMNT PAIN NOTED NONE PRSNT: CPT | Performed by: NURSE PRACTITIONER

## 2025-04-22 RX ORDER — FLUTICASONE PROPIONATE 50 MCG
2 SPRAY, SUSPENSION (ML) NASAL DAILY
Qty: 16 G | Refills: 1 | Status: SHIPPED | OUTPATIENT
Start: 2025-04-22

## 2025-04-22 RX ORDER — OLOPATADINE HYDROCHLORIDE 1 MG/ML
1 SOLUTION/ DROPS OPHTHALMIC 2 TIMES DAILY
Qty: 5 ML | Refills: 11 | Status: SHIPPED | OUTPATIENT
Start: 2025-04-22

## 2025-04-22 RX ORDER — CETIRIZINE HYDROCHLORIDE 10 MG/1
10 TABLET ORAL DAILY
Qty: 90 TABLET | Refills: 1 | Status: SHIPPED | OUTPATIENT
Start: 2025-04-22

## 2025-04-22 ASSESSMENT — PAIN SCALES - GENERAL: PAINLEVEL_OUTOF10: NO PAIN (0)

## 2025-04-22 ASSESSMENT — ENCOUNTER SYMPTOMS: WHEEZING: 1

## 2025-04-22 NOTE — PROGRESS NOTES
Assessment & Plan     Chronic rhinitis  Allergic rhinitis, unspecified seasonality, unspecified trigger  Acute conjunctivitis of both eyes, unspecified acute conjunctivitis type    -Discussed possible causes with patient including allergies, infection, nonallergic rhinitis, sinus drainage obstruction from deviated septum, or opioid withdrawal. Patient states he has not used fentanyl in 8 months so opioid withdrawal seems less likely. Recommend trying daily oral antihistamine and antihistamine eye drops along with intranasal steroid spray. Explained to patient that allergies can develop at any age    - fluticasone (FLONASE) 50 MCG/ACT nasal spray; Spray 2 sprays into both nostrils daily. SHAKE LIQUID AND USE  - cetirizine (ZYRTEC) 10 MG tablet; Take 1 tablet (10 mg) by mouth daily.  - olopatadine (PATANOL) 0.1 % ophthalmic solution; Place 1 drop into both eyes 2 times daily.    - Adult ENT  Referral; Future  - Adult Allergy/Asthma  Referral; Future    - Allergen cat epithellium IgE  - Allergen dog epithelium IgE  - Allergen Darian grass IgE  - Allergen orchard grass IgE  - Allergen yasmany IgE  - Allergen D farinae IgE  - Allergen D pteronyssinus IgE  - Allergen alternaria alternata IgE  - Allergen aspergillus fumigatus IgE  - Allergen cladosporium herbarum IgE  - Allergen Epicoccum purpurascens IgE  - Allergen penicillium notatum IgE  - Allergen jeramy white IgE  - Allergen Cedar IgE  - Allergen cottonwood IgE  - Allergen elm IgE  - Allergen maple box elder IgE  - Allergen oak white IgE  - Allergen [place] IgE  - Allergen silver  birch IgE  - Allergen Tree [place] IgE  - Allergen [place] Tree  - Allergen white pine IgE  - Allergen English plantain IgE  - Allergen giant ragweed IgE  - Allergen lamb's quarter IgE  - Allergen Mugwort IgE  - Allergen ragweed short IgE  - Allergen Sagebrush Wormwood IgE  - Allergen Sheep Sorrel IgE  - Allergen thistle Russian IgE  - Allergen Weed Nettle  "IgE  - Allergen, Kochia/Firebush  - IgE      Deviated nasal septum  -causing issues with congestion, difficulty breathing through nose, he would like to discuss surgical correction.  - Adult ENT  Referral; Future    [place]  -has seen podiatry in the past and was advised conservative treatment, states symptoms have not improved in the past year and would like to know if he's eligible for surgery.   - Orthopedic  Referral; Future                Subjective   Charles is a 34 year old, presenting for the following health issues:  Allergies        4/22/2025     2:07 PM   Additional Questions   Roomed by Divine FERNÁNDEZ   Accompanied by self     Patient presents with complaints of itchy, watery eyes, dry eys, itchy ears, dark circles under eyes, congestion every morning, and fatigue. Nostrils will get clogged every day. Requesting referral to an allergist. Has history of deviated septum. When he was born had cleft lip surgery.    Has been taking ibuprofen and tylenol.     Reports sober from fentanyl for 8 months.     Symptoms did not change after moving out of his mother's house last year. Symptoms are all year round. Worse when he wakes up in the morning. Not taking flonase after insurance ran out.     History of Present Illness       Reason for visit:  Allergies  Symptom onset:  More than a month  Symptoms include:  Itchy ears & eyes, dark circles, sinus infection, mucus.  Symptom intensity:  Severe  Symptom progression:  Worsening  Had these symptoms before:  Yes  Has tried/received treatment for these symptoms:  Yes  Previous treatment was successful:  No   He is taking medications regularly.                      Objective    /84   Pulse 76   Temp 97  F (36.1  C) (Tympanic)   Resp 20   Ht 1.859 m (6' 1.19\")   Wt 66.8 kg (147 lb 3.2 oz)   SpO2 99%   BMI 19.32 kg/m    Body mass index is 19.32 kg/m .  Physical Exam  Constitutional:       Appearance: Normal appearance. He is not ill-appearing.   HENT: "      Head: Normocephalic.      Right Ear: A middle ear effusion is present. No hemotympanum. Tympanic membrane is not injected, scarred, perforated, erythematous, retracted or bulging.      Left Ear: A middle ear effusion is present. No hemotympanum. Tympanic membrane is not injected, scarred, perforated, erythematous, retracted or bulging.      Nose: Septal deviation, congestion and rhinorrhea present. Rhinorrhea is clear.      Right Turbinates: Swollen.      Left Turbinates: Swollen.      Right Sinus: Maxillary sinus tenderness present. No frontal sinus tenderness.      Left Sinus: Maxillary sinus tenderness present. No frontal sinus tenderness.      Mouth/Throat:      Mouth: Mucous membranes are moist.      Pharynx: Oropharynx is clear. No pharyngeal swelling, oropharyngeal exudate or posterior oropharyngeal erythema.      Tonsils: No tonsillar exudate. 0 on the right. 0 on the left.   Eyes:      General: Lids are normal.         Right eye: No foreign body, discharge or hordeolum.         Left eye: No foreign body, discharge or hordeolum.      Extraocular Movements: Extraocular movements intact.      Conjunctiva/sclera:      Right eye: Right conjunctiva is not injected. No chemosis or exudate.     Left eye: Left conjunctiva is not injected. No chemosis or exudate.     Pupils: Pupils are equal, round, and reactive to light.   Cardiovascular:      Rate and Rhythm: Normal rate and regular rhythm.      Pulses: Normal pulses.      Heart sounds: Normal heart sounds. No murmur heard.     No friction rub. No gallop.   Pulmonary:      Effort: Pulmonary effort is normal.      Breath sounds: Normal breath sounds.   Musculoskeletal:      Cervical back: Normal range of motion and neck supple.   Lymphadenopathy:      Cervical: No cervical adenopathy.   Skin:     General: Skin is warm and dry.   Neurological:      General: No focal deficit present.      Mental Status: He is alert and oriented to person, place, and time.    Psychiatric:         Mood and Affect: Mood normal.         Behavior: Behavior normal.         Thought Content: Thought content normal.         Judgment: Judgment normal.                    Signed Electronically by: ROSS Glass CNP

## 2025-04-23 ENCOUNTER — PATIENT OUTREACH (OUTPATIENT)
Dept: CARE COORDINATION | Facility: CLINIC | Age: 35
End: 2025-04-23
Payer: COMMERCIAL

## 2025-04-23 ENCOUNTER — MYC MEDICAL ADVICE (OUTPATIENT)
Dept: FAMILY MEDICINE | Facility: CLINIC | Age: 35
End: 2025-04-23
Payer: COMMERCIAL

## 2025-04-23 LAB
A ALTERNATA IGE QN: 1.19 KU(A)/L
A FUMIGATUS IGE QN: <0.1 KU(A)/L
C HERBARUM IGE QN: <0.1 KU(A)/L
CALIF WALNUT POLN IGE QN: 0.13 KU(A)/L
CAT DANDER IGG QN: 7.06 KU(A)/L
CEDAR IGE QN: <0.1 KU(A)/L
COCKSFOOT IGE QN: 0.81 KU(A)/L
COMMON RAGWEED IGE QN: 28.9 KU(A)/L
COTTONWOOD IGE QN: <0.1 KU(A)/L
D FARINAE IGE QN: 9.18 KU(A)/L
D PTERONYSS IGE QN: 8.58 KU(A)/L
DOG DANDER+EPITH IGE QN: 0.66 KU(A)/L
E PURPURASCENS IGE QN: <0.1 KU(A)/L
EAST WHITE PINE IGE QN: <0.1 KU(A)/L
ENGL PLANTAIN IGE QN: <0.1 KU(A)/L
FIREBUSH IGE QN: <0.1 KU(A)/L
GIANT RAGWEED IGE QN: 13.2 KU(A)/L
GOOSEFOOT IGE QN: <0.1 KU(A)/L
IGE SERPL-ACNC: 559 KU/L (ref 0–114)
JOHNSON GRASS IGE QN: 0.23 KU(A)/L
MAPLE IGE QN: 4.06 KU(A)/L
MUGWORT IGE QN: 0.46 KU(A)/L
NETTLE IGE QN: 0.15 KU(A)/L
P NOTATUM IGE QN: <0.1 KU(A)/L
RED MULBERRY IGE QN: <0.1 KU(A)/L
SALTWORT IGE QN: 0.11 KU(A)/L
SHEEP SORREL IGE QN: <0.1 KU(A)/L
SILVER BIRCH IGE QN: 0.4 KU(A)/L
TIMOTHY IGE QN: 0.71 KU(A)/L
WHITE ASH IGE QN: <0.1 KU(A)/L
WHITE ELM IGE QN: 0.19 KU(A)/L
WHITE MULBERRY IGE QN: <0.1 KU(A)/L
WHITE OAK IGE QN: 0.43 KU(A)/L
WORMWOOD IGE QN: 2.83 KU(A)/L

## 2025-06-09 ENCOUNTER — ANCILLARY PROCEDURE (OUTPATIENT)
Dept: GENERAL RADIOLOGY | Facility: CLINIC | Age: 35
End: 2025-06-09
Attending: STUDENT IN AN ORGANIZED HEALTH CARE EDUCATION/TRAINING PROGRAM
Payer: COMMERCIAL

## 2025-06-09 DIAGNOSIS — M21.619 BUNION: ICD-10-CM

## 2025-06-09 PROCEDURE — 73630 X-RAY EXAM OF FOOT: CPT | Mod: TC | Performed by: RADIOLOGY

## 2025-06-22 NOTE — CONSULTS
Psychiatry Consultation; Follow up              Reason for Consult, requesting source:    Psychosis. He was seen by Neville Kay CNP from our service yesterday.   Requesting source: Kim Calderon    Labs and imaging reviewed, reviewed with nursing primary team.                Interim history:    When the patient was interviewed today he was for the most part covering his head with a blanket.  Did reluctantly say a few words, but for the most part would not cooperate with an interview.  Per his bedside attendant, he is mostly sleeping but occasionally quite restless.  He is not agitated or responding to visual hallucinations.  As you can see from Neville Kay's note from yesterday he was quite disorganized and very paranoid on admission.  He also is on a 72-hour hold.   He is now off Precedex and I have been assured that he is medically stable.           Current Medications:     Current Facility-Administered Medications   Medication     acetaminophen (TYLENOL) tablet 650 mg     ALPRAZolam (XANAX) tablet 1 mg     amoxicillin (AMOXIL) tablet 875 mg     [Held by provider] busPIRone (BUSPAR) tablet 15 mg     glucose gel 15-30 g    Or     dextrose 50 % injection 25-50 mL    Or     glucagon injection 1 mg     haloperidol lactate (HALDOL) injection 5 mg     lactated ringers infusion     LORazepam (ATIVAN) injection 2 mg     OLANZapine zydis (zyPREXA) ODT tab 10 mg     OLANZapine zydis (zyPREXA) ODT tab 5 mg     omeprazole (priLOSEC) CR capsule 20 mg     ondansetron (ZOFRAN) injection 4 mg              MSE:     Lying quietly in the hospital bed with sheet over his head. Reluctantly moves the sheet to talk, but minimal interaction. Grooming ok, low BMI noted.      Vital signs:  Temp: 99.4  F (37.4  C) Temp src: Oral BP: (!) 129/95 Pulse: 74   Resp: 16 SpO2: 99 % O2 Device: None (Room air)        Estimated body mass index is 14.43 kg/m  as calculated from the following:    Height as of 7/8/21: 1.88 m (6'  "----- Message from Esperanza PARDO sent at 6/19/2025  2:39 PM EDT -----  Regarding: RE: PA denial Dupixent  Hello! We received a fax confirmation that the appeal has been received by the plan, so hopefully it will be reviewed soon.  ----- Message -----  From: Soledad Trevino RN  Sent: 6/19/2025   1:42 PM EDT  To: Michel Enamorado PharmMARIELY; Neema Oviedo MD; Gayatri#  Subject: RE: GISEL Newmanixaugustus                           Hi! Checking up on this.  ----- Message -----  From: Gayatri Boone PharmD  Sent: 6/11/2025   4:37 PM EDT  To: Michel Enamorado PharmD; Neema Oviedo MD; Wolf#  Subject: RE: GISEL Jones                           Thanks Esperanza.    I can submit an appeal. Will keep you posted on what they decide.    ThanksGayatri  ----- Message -----  From: Esperanza Ramos  Sent: 6/11/2025   4:15 PM EDT  To: Neema Oviedo MD; Soledad Trevino RN; Rxs#  Subject: PA denial Dupixent                               I saw that patient tried and failed tacrolimus and submitted a new Dupixent PA to [facility]/Medicaid.    They are saying the patient \"hasn't really tried 90 days\" and they only go by claims and pt has only filled 2- 30 days supply per claims....I asked them to consider maybe she isn't using an entire tube on 10% of her body. They said oh I guess that depends -but add that to your appeal....  Also the note says she tried it for 3 weeks and had zero success. So like why would she try it for 90 days....    Basically this is going to need an appeal. So frustrating!  Gayatri/ Michel could you assist?    The denial reason given is.    Coverage is provided when the member meets the following requirement: Must have at least 10% body surface area (BSA) involvement AND member has a history of at least 90 days with TWO of the following: topical corticosteroids (for example: triamcinolone or betamethasone valerate), or topical calcineurin inhibitors (for example: Elidel) unless atopic dermatitis is severe and involves greater " "2\").    Weight as of 7/8/21: 51 kg (112 lb 6.4 oz).              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          Summary of Recommendations:   He will need transfer to IP psychiatry on a 72 hour hold.   Page me at [phone] as needed.        \"Much or all of the text in this note was generated through the use of Dragon Dictate voice to text software. Errors in spelling or words which appear to be out of contact are unintentional, may be present due having escaped editing\"           " than 25% of BSA. [facility] Policy for Medical Necessity as posted on the  Esperanza Emery

## 2025-11-19 ENCOUNTER — PRE VISIT (OUTPATIENT)
Dept: ALLERGY | Facility: CLINIC | Age: 35
End: 2025-11-19